# Patient Record
Sex: FEMALE | Race: BLACK OR AFRICAN AMERICAN | NOT HISPANIC OR LATINO
[De-identification: names, ages, dates, MRNs, and addresses within clinical notes are randomized per-mention and may not be internally consistent; named-entity substitution may affect disease eponyms.]

---
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
## 2022-04-10 ENCOUNTER — INPATIENT (INPATIENT)
Facility: HOSPITAL | Age: 22
LOS: 9 days | Discharge: HOME | End: 2022-04-20
Attending: PSYCHIATRY & NEUROLOGY | Admitting: PSYCHIATRY & NEUROLOGY
Payer: COMMERCIAL

## 2022-04-10 VITALS
TEMPERATURE: 98 F | HEART RATE: 83 BPM | SYSTOLIC BLOOD PRESSURE: 112 MMHG | DIASTOLIC BLOOD PRESSURE: 75 MMHG | RESPIRATION RATE: 18 BRPM | OXYGEN SATURATION: 100 %

## 2022-04-10 DIAGNOSIS — F32.A DEPRESSION, UNSPECIFIED: ICD-10-CM

## 2022-04-10 DIAGNOSIS — F41.9 ANXIETY DISORDER, UNSPECIFIED: ICD-10-CM

## 2022-04-10 LAB
ALBUMIN SERPL ELPH-MCNC: 4.5 G/DL — SIGNIFICANT CHANGE UP (ref 3.3–5)
ALP SERPL-CCNC: 83 U/L — SIGNIFICANT CHANGE UP (ref 40–120)
ALT FLD-CCNC: 8 U/L — SIGNIFICANT CHANGE UP (ref 4–33)
AMPHET UR-MCNC: NEGATIVE — SIGNIFICANT CHANGE UP
ANION GAP SERPL CALC-SCNC: 15 MMOL/L — HIGH (ref 7–14)
APAP SERPL-MCNC: <10 UG/ML — LOW (ref 15–25)
APPEARANCE UR: CLEAR — SIGNIFICANT CHANGE UP
AST SERPL-CCNC: 19 U/L — SIGNIFICANT CHANGE UP (ref 4–32)
B PERT DNA SPEC QL NAA+PROBE: SIGNIFICANT CHANGE UP
B PERT+PARAPERT DNA PNL SPEC NAA+PROBE: SIGNIFICANT CHANGE UP
BARBITURATES UR SCN-MCNC: NEGATIVE — SIGNIFICANT CHANGE UP
BASOPHILS # BLD AUTO: 0.03 K/UL — SIGNIFICANT CHANGE UP (ref 0–0.2)
BASOPHILS NFR BLD AUTO: 0.5 % — SIGNIFICANT CHANGE UP (ref 0–2)
BENZODIAZ UR-MCNC: NEGATIVE — SIGNIFICANT CHANGE UP
BILIRUB SERPL-MCNC: 0.2 MG/DL — SIGNIFICANT CHANGE UP (ref 0.2–1.2)
BILIRUB UR-MCNC: NEGATIVE — SIGNIFICANT CHANGE UP
BORDETELLA PARAPERTUSSIS (RAPRVP): SIGNIFICANT CHANGE UP
BUN SERPL-MCNC: 7 MG/DL — SIGNIFICANT CHANGE UP (ref 7–23)
C PNEUM DNA SPEC QL NAA+PROBE: SIGNIFICANT CHANGE UP
CALCIUM SERPL-MCNC: 9.5 MG/DL — SIGNIFICANT CHANGE UP (ref 8.4–10.5)
CHLORIDE SERPL-SCNC: 104 MMOL/L — SIGNIFICANT CHANGE UP (ref 98–107)
CO2 SERPL-SCNC: 19 MMOL/L — LOW (ref 22–31)
COCAINE METAB.OTHER UR-MCNC: NEGATIVE — SIGNIFICANT CHANGE UP
COLOR SPEC: SIGNIFICANT CHANGE UP
COVID-19 SPIKE DOMAIN AB INTERP: POSITIVE
COVID-19 SPIKE DOMAIN ANTIBODY RESULT: >250 U/ML — HIGH
CREAT SERPL-MCNC: 0.78 MG/DL — SIGNIFICANT CHANGE UP (ref 0.5–1.3)
CREATININE URINE RESULT, DAU: 128 MG/DL — SIGNIFICANT CHANGE UP
DIFF PNL FLD: NEGATIVE — SIGNIFICANT CHANGE UP
EGFR: 112 ML/MIN/1.73M2 — SIGNIFICANT CHANGE UP
EOSINOPHIL # BLD AUTO: 0.06 K/UL — SIGNIFICANT CHANGE UP (ref 0–0.5)
EOSINOPHIL NFR BLD AUTO: 1.1 % — SIGNIFICANT CHANGE UP (ref 0–6)
ETHANOL SERPL-MCNC: <10 MG/DL — SIGNIFICANT CHANGE UP
FLUAV SUBTYP SPEC NAA+PROBE: SIGNIFICANT CHANGE UP
FLUBV RNA SPEC QL NAA+PROBE: SIGNIFICANT CHANGE UP
GLUCOSE SERPL-MCNC: 102 MG/DL — HIGH (ref 70–99)
GLUCOSE UR QL: NEGATIVE — SIGNIFICANT CHANGE UP
HADV DNA SPEC QL NAA+PROBE: SIGNIFICANT CHANGE UP
HCG SERPL-ACNC: <5 MIU/ML — SIGNIFICANT CHANGE UP
HCOV 229E RNA SPEC QL NAA+PROBE: SIGNIFICANT CHANGE UP
HCOV HKU1 RNA SPEC QL NAA+PROBE: SIGNIFICANT CHANGE UP
HCOV NL63 RNA SPEC QL NAA+PROBE: SIGNIFICANT CHANGE UP
HCOV OC43 RNA SPEC QL NAA+PROBE: SIGNIFICANT CHANGE UP
HCT VFR BLD CALC: 40.4 % — SIGNIFICANT CHANGE UP (ref 34.5–45)
HGB BLD-MCNC: 13 G/DL — SIGNIFICANT CHANGE UP (ref 11.5–15.5)
HMPV RNA SPEC QL NAA+PROBE: SIGNIFICANT CHANGE UP
HPIV1 RNA SPEC QL NAA+PROBE: SIGNIFICANT CHANGE UP
HPIV2 RNA SPEC QL NAA+PROBE: SIGNIFICANT CHANGE UP
HPIV3 RNA SPEC QL NAA+PROBE: SIGNIFICANT CHANGE UP
HPIV4 RNA SPEC QL NAA+PROBE: SIGNIFICANT CHANGE UP
IANC: 2.65 K/UL — SIGNIFICANT CHANGE UP (ref 1.8–7.4)
IMM GRANULOCYTES NFR BLD AUTO: 0.2 % — SIGNIFICANT CHANGE UP (ref 0–1.5)
KETONES UR-MCNC: NEGATIVE — SIGNIFICANT CHANGE UP
LEUKOCYTE ESTERASE UR-ACNC: NEGATIVE — SIGNIFICANT CHANGE UP
LYMPHOCYTES # BLD AUTO: 2.47 K/UL — SIGNIFICANT CHANGE UP (ref 1–3.3)
LYMPHOCYTES # BLD AUTO: 43.6 % — SIGNIFICANT CHANGE UP (ref 13–44)
M PNEUMO DNA SPEC QL NAA+PROBE: SIGNIFICANT CHANGE UP
MCHC RBC-ENTMCNC: 26.8 PG — LOW (ref 27–34)
MCHC RBC-ENTMCNC: 32.2 GM/DL — SIGNIFICANT CHANGE UP (ref 32–36)
MCV RBC AUTO: 83.3 FL — SIGNIFICANT CHANGE UP (ref 80–100)
METHADONE UR-MCNC: NEGATIVE — SIGNIFICANT CHANGE UP
MONOCYTES # BLD AUTO: 0.44 K/UL — SIGNIFICANT CHANGE UP (ref 0–0.9)
MONOCYTES NFR BLD AUTO: 7.8 % — SIGNIFICANT CHANGE UP (ref 2–14)
NEUTROPHILS # BLD AUTO: 2.65 K/UL — SIGNIFICANT CHANGE UP (ref 1.8–7.4)
NEUTROPHILS NFR BLD AUTO: 46.8 % — SIGNIFICANT CHANGE UP (ref 43–77)
NITRITE UR-MCNC: NEGATIVE — SIGNIFICANT CHANGE UP
NRBC # BLD: 0 /100 WBCS — SIGNIFICANT CHANGE UP
NRBC # FLD: 0 K/UL — SIGNIFICANT CHANGE UP
OPIATES UR-MCNC: NEGATIVE — SIGNIFICANT CHANGE UP
OXYCODONE UR-MCNC: NEGATIVE — SIGNIFICANT CHANGE UP
PCP SPEC-MCNC: SIGNIFICANT CHANGE UP
PCP UR-MCNC: NEGATIVE — SIGNIFICANT CHANGE UP
PH UR: 6.5 — SIGNIFICANT CHANGE UP (ref 5–8)
PLATELET # BLD AUTO: 274 K/UL — SIGNIFICANT CHANGE UP (ref 150–400)
POTASSIUM SERPL-MCNC: 3.9 MMOL/L — SIGNIFICANT CHANGE UP (ref 3.5–5.3)
POTASSIUM SERPL-SCNC: 3.9 MMOL/L — SIGNIFICANT CHANGE UP (ref 3.5–5.3)
PROT SERPL-MCNC: 7.9 G/DL — SIGNIFICANT CHANGE UP (ref 6–8.3)
PROT UR-MCNC: NEGATIVE — SIGNIFICANT CHANGE UP
RAPID RVP RESULT: SIGNIFICANT CHANGE UP
RBC # BLD: 4.85 M/UL — SIGNIFICANT CHANGE UP (ref 3.8–5.2)
RBC # FLD: 12.8 % — SIGNIFICANT CHANGE UP (ref 10.3–14.5)
RSV RNA SPEC QL NAA+PROBE: SIGNIFICANT CHANGE UP
RV+EV RNA SPEC QL NAA+PROBE: SIGNIFICANT CHANGE UP
SALICYLATES SERPL-MCNC: <0.3 MG/DL — LOW (ref 15–30)
SARS-COV-2 IGG+IGM SERPL QL IA: >250 U/ML — HIGH
SARS-COV-2 IGG+IGM SERPL QL IA: POSITIVE
SARS-COV-2 RNA SPEC QL NAA+PROBE: SIGNIFICANT CHANGE UP
SODIUM SERPL-SCNC: 138 MMOL/L — SIGNIFICANT CHANGE UP (ref 135–145)
SP GR SPEC: 1.02 — SIGNIFICANT CHANGE UP (ref 1–1.05)
THC UR QL: NEGATIVE — SIGNIFICANT CHANGE UP
TOXICOLOGY SCREEN, DRUGS OF ABUSE, SERUM RESULT: SIGNIFICANT CHANGE UP
TSH SERPL-MCNC: 0.67 UIU/ML — SIGNIFICANT CHANGE UP (ref 0.5–4.3)
UROBILINOGEN FLD QL: SIGNIFICANT CHANGE UP
WBC # BLD: 5.66 K/UL — SIGNIFICANT CHANGE UP (ref 3.8–10.5)
WBC # FLD AUTO: 5.66 K/UL — SIGNIFICANT CHANGE UP (ref 3.8–10.5)

## 2022-04-10 PROCEDURE — 93010 ELECTROCARDIOGRAM REPORT: CPT

## 2022-04-10 PROCEDURE — 99285 EMERGENCY DEPT VISIT HI MDM: CPT | Mod: 25

## 2022-04-10 RX ORDER — HYDROXYZINE HCL 10 MG
25 TABLET ORAL EVERY 6 HOURS
Refills: 0 | Status: DISCONTINUED | OUTPATIENT
Start: 2022-04-11 | End: 2022-04-20

## 2022-04-10 NOTE — ED BEHAVIORAL HEALTH ASSESSMENT NOTE - RISK ASSESSMENT
Low Acute Suicide Risk RISK FACTORS:  Modifiable risk factors: depression, anxiety, bertha, SI  Unmodifiable risk factors: self reports that she has been doing poorly, self report hx of bipolar disorder and anxiety, not receiving psych meds, hx of trauma, family hx of depression, family hx of alcoholism    Protective factors: currently denies (and no objective evidence of) acute suicidality, no hx of SA or any self injurious behaviors,  Domiciled, part time employed and in school, she is future-oriented/ help seeking, endorses responsibility to family (particularly to sister), no access to lethal means like guns, no complex medical issues or chronic pains, is not acutely manic or floridly psychotic, no hx of violence or any pending legal cases, not intoxicated or in withdrawal, Restorationism    At this time given all risks taken into consideration, the Pt is Not at imminent risk for self harm/ suicide and is also not at chronically elevated risk of harming self.  however, Pt's current presentation is not be deemed dischargeable back to the community.  Current increased in risks can be mitigated by a psychiatric admission with supervision, continuing psych meds with titration towards efficacious dosing range Moderate Acute Suicide Risk RISK FACTORS:  Modifiable risk factors: depression, anxiety, bertha, on and off SI with plans  Unmodifiable risk factors: self reports that she has been doing poorly, self report hx of bipolar disorder and anxiety, not receiving psych meds, hx of trauma, family hx of depression, family hx of alcoholism    Protective factors: currently denies (and no objective evidence of) acute suicidality, no hx of SA or any self injurious behaviors,  Domiciled, part time employed and in school, she is future-oriented/ help seeking, endorses responsibility to family (particularly to sister), no access to lethal means like guns, no complex medical issues or chronic pains, is not acutely manic or floridly psychotic, no hx of violence or any pending legal cases, not intoxicated or in withdrawal, Adventist    At this time given all risks taken into consideration, even though currently Pt is not acutely suicidal, given that there is recurring SI with 2 plans along with other factors (as mentioned above), the Pt poses as moderate acute risk towards self harm.  however, she is not at chronically elevated risk of harming self.  nevertheless, Pt's current presentation is not be deemed dischargeable back to the community.  Current increased in risks can be mitigated by a psychiatric admission with supervision, continuing psych meds with titration towards efficacious dosing range

## 2022-04-10 NOTE — ED BEHAVIORAL HEALTH ASSESSMENT NOTE - NAME OF SCHOOL
[employer] ([employer]) Hourly Rounding/Move Toilet (commode/urinal) to patient using "arms reach"/Enhanced Supervision

## 2022-04-10 NOTE — ED BEHAVIORAL HEALTH ASSESSMENT NOTE - PSYCHIATRIC ISSUES AND PLAN (INCLUDE STANDING AND PRN MEDICATION)
defer starting anti-depressant/ mood stabilizer to primary treatment team. PRN: atarax 25mg PO q6hrs for anxiety; PRN: ativan 0.5mg PO q6Hrs for agitation; PRN: ativan 1mg IM q6Hrs for severe agitation

## 2022-04-10 NOTE — ED BEHAVIORAL HEALTH ASSESSMENT NOTE - SUMMARY
21/F with self reported hx of bipolar disorder and anxiety; no prior psych admissions; no reported hx of SA nor previously/ recently engaged in self injurious behaviors and no illicit substance use. Today, self presented to the ED due to worsening depression and SI (with plans).    at this time, endorses symptoms of worsening depression and anxiety along with SI (2 previous plans). historical accounts provided by Pt points to a possible bipolar depression.. of course, will also need to entertain for a possible axis II pathology (borderline PD) for this Pt.      Pt is affectively dysregulated, is feeling increasingly hopeless, helpless and worthless; having on & off suicidal thoughts.. at this time, is not homicidal.  is not acutely manic or psychotic.  Pt is unable to partake towards safety planning enough to justify for a safe discharge. she is help seeking and requests in-Pt psych admission.  hence, to facilitate this request with aim of mood stabilization and ensuring safety    RECOMMENDATIONS:  1. defer starting anti-depressant/ mood stabilizer to primary treatment team.   2. PRN: atarax 25mg PO q6hrs for anxiety  3. PRN: ativan 0.5mg PO q6Hrs for agitation; PRN: ativan 1mg IM q6Hrs for severe agitation  4. ONCE medically cleared, facilitate transfer to [facility] on 9.13 status  - no need for constant 1:1   - for now, no beds are available at [facility]. hence, to temporarily board here at the  ED  - discussed with  ED staff

## 2022-04-10 NOTE — ED BEHAVIORAL HEALTH ASSESSMENT NOTE - NS ED BHA PLAN ADMIT TO PSYCHIATRY BH CONTACTED FT
attempted to contact Pt's therapist, Lawanda Lobato at [phone] - not working/ [phone] - no answer; left voice mail attempted to contact Pt's therapist, Lawanda Lobato at [phone] - not working/ [phone]/ [phone]  - no answer; left voice mail
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
## 2022-04-10 NOTE — ED PROVIDER NOTE - CLINICAL SUMMARY MEDICAL DECISION MAKING FREE TEXT BOX
20 y/o F     Labs, Urine Tox/ UA, EKG  Medical evaluation performed. There is no clinical evidence of intoxication or any acute medical problem requiring immediate intervention. Patient is awaiting psychiatric consultation. Final disposition will be determined by psychiatrist.

## 2022-04-10 NOTE — ED BEHAVIORAL HEALTH NOTE - BEHAVIORAL HEALTH NOTE
COVID Exposure Screen- Patient  1.	*Have you had a COVID-19 test in the last 90 days?  (  ) Yes   ( X ) No   (  ) Unknown- Reason: _____  IF YES PROCEED TO QUESTION #2. IF NO OR UNKNOWN, PLEASE SKIP TO QUESTION #3.  2.	Date of test(s) and result(s): ________  3.	*Have you tested positive for COVID-19 antibodies? (  ) Yes   ( X ) No   (  ) Unknown- Reason: _____  IF YES PROCEED TO QUESTION #4. IF NO or UNKNOWN, PLEASE SKIP TO QUESTION #5.  4.	Date of positive antibody test: ________  5.	*Have you received 2 doses of the COVID-19 vaccine? (  ) Yes   ( X ) No   (  ) Unknown- Reason: _____   IF YES PROCEED TO QUESTION #6. IF NO or UNKNOWN, PLEASE SKIP TO QUESTION #7.  6.	Date of second dose: ________ RECEIVED SINGLE DOSE J & J vaccine last   7.	*In the past 10 days, have you been around anyone with a positive COVID-19 test?* (  ) Yes   ( X ) No   (  ) Unknown- Reason: ____  IF YES PROCEED TO QUESTION #8. IF NO or UNKNOWN, PLEASE SKIP TO QUESTION #13.  8.	Were you within 6 feet of them for at least 15 minutes? (  ) Yes   (  ) No   (  ) Unknown- Reason: _____  9.	Have you provided care for them? (  ) Yes   (  ) No   (  ) Unknown- Reason: ______  10.	Have you had direct physical contact with them (touched, hugged, or kissed them)? (  ) Yes   (  ) No    (  ) Unknown- Reason: _____  11.	Have you shared eating or drinking utensils with them? (  ) Yes   (  ) No    (  ) Unknown- Reason: ____  12.	Have they sneezed, coughed, or somehow gotten respiratory droplets on you? (  ) Yes   (  ) No    (  ) Unknown- Reason: ______  13.	*Have you been out of [place] within the past 10 days?* (  ) Yes   ( X ) No   (  ) Unknown- Reason: _____  IF YES PLEASE ANSWER THE FOLLOWING QUESTIONS:  14.	Which state/country have you been to? ______  15.	Were you there over 24 hours? (  ) Yes   (  ) No    (  ) Unknown- Reason: ______  16.	Date of return to [facility]: ______

## 2022-04-10 NOTE — ED ADULT NURSE REASSESSMENT NOTE - NS ED NURSE REASSESS COMMENT FT1
Resting in bed A and Ox3 in NAD calm and cooperative, answers to questions appropriately. denies SI, HI talia JEWELLH.

## 2022-04-10 NOTE — ED BEHAVIORAL HEALTH ASSESSMENT NOTE - OTHER PAST PSYCHIATRIC HISTORY (INCLUDE DETAILS REGARDING ONSET, COURSE OF ILLNESS, INPATIENT/OUTPATIENT TREATMENT)
self reported hx of bipolar disorder and anxiety  no prior psych admissions  no reported hx of SA nor previously/ recently engaged in self injurious behaviors   currently, in therapy with Ms Lawanda Lobato  no psychiatrist  never been on psych meds
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
## 2022-04-10 NOTE — ED ADULT NURSE NOTE - OBJECTIVE STATEMENT
Pt presents to , alert&orientedx4, ambulatory, sent in by her psychiatrist coming to ED for SI with a plan to jump off a bridge. Denies any prior hx of self-harm. Denies any HI, auditory or visual hallucinations. No drug use or ETOH use. Pt is calm and cooperative at this time.  Pending psych eval

## 2022-04-10 NOTE — ED BEHAVIORAL HEALTH ASSESSMENT NOTE - DESCRIPTION
single and currently living with her friend, Lise (in [facility]).. been living with Mary since 2/2022. goes to trade school ([employer]). has 2 siblings (a half brother whom she is not close with and a 17 yr old sister, Holly. she likes playing videogames - [employer] as well as viewing Lifestyle Vlogging on [employer].  Pt is Holiness. no reported pets. no reported access to guns pre-DM, HLD. NOT ON MAINTENANCE MEDS Since her  ED arrival, the Pt has been calm and cooperative.  There has been no occurrence of agitation/aggressive behavior.  No current verbalization of active/ passive SI/HI.   There are no signs/symptoms of acute bertha or florid psychosis.  Pt is not showing any signs/symptoms of intoxication or withdrawal.  she is not delirious.  Pt has not tested limits.. Has maintained appropriate boundaries. Pt has been easily redirected.  Overall, since her  ED admission, there has been no reported management issues.      Vital Signs Last 24 Hrs  T(C): 36.7 (10 Apr 2022 15:45), Max: 36.7 (10 Apr 2022 15:45)  T(F): 98.1 (10 Apr 2022 15:45), Max: 98.1 (10 Apr 2022 15:45)  HR: 83 (10 Apr 2022 15:45) (83 - 83)  BP: 112/75 (10 Apr 2022 15:45) (112/75 - 112/75)  BP(mean): --  RR: 18 (10 Apr 2022 15:45) (18 - 18)  SpO2: 100% (10 Apr 2022 15:45) (100% - 100%) single and currently living with her friend, Mary (in [facility]).. been living with Mary since 2/2022. goes to trade school ([employer]). has 2 siblings (a half brother whom she is not close with and a 17 yr old sister, Holly. she likes playing videogames - [employer] as well as viewing Lifestyle Vlogging on [employer].  Pt is Cheondoism. no reported pets. no reported access to guns. employed part time as a HHA
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
## 2022-04-10 NOTE — ED ADULT TRIAGE NOTE - CHIEF COMPLAINT QUOTE
Pt sent by therapist for SI with a plan. Pt calm and cooperative, denies hallucinations or drugs/etoh. PMH bipolar, not on meds

## 2022-04-10 NOTE — ED PROVIDER NOTE - OBJECTIVE STATEMENT
22 y/o F   presents to ER   secondary to  worsening depression and  suicidal ideations . Admits to multiple social  stressors and inability to cope. Denies AH/HI/VH.   Denies falling, punching or kicking any objects. Denies pain, SOB, fever , chills,  chest /abdominal discomfort.  Denies use of alcohol or illicit drugs.   No evidence of physical injuries, broken skin or deformities.

## 2022-04-10 NOTE — ED BEHAVIORAL HEALTH ASSESSMENT NOTE - OTHER
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 does not appear to be internally stimulated passive SI - currently no active SI; no intent or plans close friend since 2/2022 sister and a close friend by hx: impaired distracted not on psych meds

## 2022-04-10 NOTE — ED BEHAVIORAL HEALTH ASSESSMENT NOTE - HPI (INCLUDE ILLNESS QUALITY, SEVERITY, DURATION, TIMING, CONTEXT, MODIFYING FACTORS, ASSOCIATED SIGNS AND SYMPTOMS)
21 yr old female, single, domiciled and part time employed/ going to trade school. self reported hx of bipolar disorder and anxiety; no prior psych admissions; no reported hx of SA nor previously/ recently engaged in self injurious behaviors and no illicit substance use. Today, self presented to the ED accomapnied by her sister and close friend upon the behest of her therapist for evaluation of worsening depression and SI (with plans). 21 yr old female, single, domiciled and part time employed/ going to trade school. self reported hx of bipolar disorder and anxiety; no prior psych admissions; no reported hx of SA nor previously/ recently engaged in self injurious behaviors and no illicit substance use. Today, self presented to the ED accompanied by her sister and close friend upon the behest of her therapist for evaluation of worsening depression and SI (with plans). 21 yr old female, single, domiciled and part time employed/ going to trade school. self reported hx of bipolar disorder and anxiety; no prior psych admissions; no reported hx of SA nor previously/ recently engaged in self injurious behaviors and no illicit substance use. Today, self presented to the ED accompanied by her sister and close friend upon behest of her therapist for evaluation of worsening depression and SI (with plans). 21 yr old female, single, domiciled and part time employed/ going to trade school. self reported hx of bipolar disorder and anxiety; no prior psych admissions; no reported hx of SA nor previously/ recently engaged in self injurious behaviors and no illicit substance use. Today, self presented to the ED accompanied by her sister and close friend upon behest of her therapist for evaluation of worsening depression and SI (with plans).    she is seen bedside. appeared calm, cooperative and not internally preoccupied. reports of being diagnosed with bipolar illness at age 18.  symptoms experienced best described as having elevated mood associated with increased goal directed activities, racing thoughts, engaged in risk taking behavior like unplanned trips, spending sprees; has hyperverbality, increased in energy level causing sleep disruption (5 hrs nightly).  said symptoms supposedly lasting for 1 week or less with no reported compromise in her overall functionality.  last manic episode was back in 12/2021.  prior to this, another manic episode in 8/2021.      after her manic episodes, claimed that she would usually "crash".  crashing meant she would experience, predominantly depressive symptoms.  depression had been described as experiencing sad mood daily along with anhedonia. has been feeling amotivated/ not wanting to do anything. there are associated symptoms of low energy level, poor concentration, disturbance in her eating patterns (over eating at times, sometimes having decreased appetite) as well as hypersomnolence (10-14 hrs nighty).  along with current depressive symptoms, she admits chronic/ intermittent passive SI.. however, lately since the start of April, said SI has been worsening.. she has SI with 2 plans (going to [employer] and buying a rope so that she could hang self OR going to a dock and jumping into the water).  Along with said depression and SI, she also reports feeling anxious.  anxiety best described as "an overall feeling of nervousness".  there are times, that she get more anxious because of perceived criticism/ negative perception towards her work/ performance from other people.  Otherwise, pt denied experiencing any other specific anxiety disorder symptoms like NORA, agoraphobia, specific phobia, panic attacks/ panic disorder, no PTSD symptoms.  She denied feeling paranoid nor has she experienced any perceptual disturbances. Today, she as felt depressed, anxious and having on & off suicidal thoughts, she told her friend and sister that she needed to come to the hospital.. they reach out to Pt's therapist who encouraged Pt to come to the ED for further evaluation.      currently, she denied having SI or HI. whilst she did have those "distressing suicidal thoughts + hatched plans", she admits being afraid/ anxious of the thought of botching the attempt of killing self which would lead her to be physically disabled for life; i.e. she fears that if she survives the attempted suicide AND eventually bears the brunt of neurovascular sequelae, she would not be able to handle and accept the consequences. e.g. if she hangs self and survives but ended up in a vegetative state, she would have intense difficulty as well as her family/ friends dealing with this. hence, she has not decided to consummate on these recurring suicidal thoughts       ** see collateral information obtained from her friend/ sister **

## 2022-04-10 NOTE — ED BEHAVIORAL HEALTH ASSESSMENT NOTE - NSBHROSSYSTEMS_PSY_ALL_CORE
she currently denied any headaches, no dizziness, no blurring of vision; no sorethroat; no cough, no fever. no chest pains, no SOB, no palpitations, no abdominal pains, no nausea/ vomiting/ diarrhea, no dysuria, no hesitancy, no arthralgia/ no pruritus. denied any muscle/ joint pains

## 2022-04-10 NOTE — ED BEHAVIORAL HEALTH ASSESSMENT NOTE - NSSUICPROTFACT_PSY_ALL_CORE
Supportive social network of family or friends/Fear of death or the actual act of killing self/Engaged in work or school/Positive therapeutic relationships/Caodaism beliefs

## 2022-04-11 DIAGNOSIS — F33.9 MAJOR DEPRESSIVE DISORDER, RECURRENT, UNSPECIFIED: ICD-10-CM

## 2022-04-11 RX ORDER — ACETAMINOPHEN 500 MG
650 TABLET ORAL ONCE
Refills: 0 | Status: COMPLETED | OUTPATIENT
Start: 2022-04-11 | End: 2022-04-11

## 2022-04-11 RX ADMIN — Medication 650 MILLIGRAM(S): at 21:32

## 2022-04-11 RX ADMIN — Medication 650 MILLIGRAM(S): at 20:32

## 2022-04-11 NOTE — ED ADULT NURSE REASSESSMENT NOTE - NS ED NURSE REASSESS COMMENT FT1
Break Coverage RN:  Pt A&Ox4, NAD. Calm and cooperative at this time.  Denies any complaints.  Respirations even and unlabored on room air.

## 2022-04-11 NOTE — BH CONSULTATION LIAISON PROGRESS NOTE - NSBHASSESSMENTFT_PSY_ALL_CORE
21/F with self reported hx of bipolar disorder and anxiety; no prior psych admissions; no reported hx of SA nor previously/ recently engaged in self injurious behaviors and no illicit substance use. Was seen yesterday by this writer ue to worsening depression and SI (with plans). currently, continues to endorse feeling depressed and anxious.  whilst she did previously harbor SI with 2 previous plans hatched, currently denied having any SI. no HI.  of note, previous historical accounts provided by the Pt points to a possible bipolar depression..     at this time, continues to be affectively dysregulated, feeling hopeless, helpless and worthless; having on & off SI.  continues to be unable to partake towards safety planning. feels unsafe at home. Pt is help seeking and requests in-Pt psych admission.

## 2022-04-11 NOTE — BH PATIENT PROFILE - FALL HARM RISK - UNIVERSAL INTERVENTIONS
Non-slip footwear when patient is out of bed/[place] to call system/Physically safe environment - no spills, clutter or unnecessary equipment/Purposeful Proactive Rounding/Room/bathroom lighting operational, light cord in reach

## 2022-04-11 NOTE — BH CONSULTATION LIAISON PROGRESS NOTE - NSBHFUPINTERVALHXFT_PSY_A_CORE
was seen yesterday by this writer after she self presented to the ED due to worsening depression and SI (with plans).  reported that since the start of April, has been increasingly feeling sadder, with anxiety and having "distressing SI" with 2 plans (going to [employer] and buying a rope so that she could hang self OR going to a dock and jumping into the water).     continues to report feeling depressed.. anxious whilst during her overnight stay at the  ED. does not feel safe to go back home. bent on pursuing in-pt psych admission.      Since her  ED arrival, the Pt has been calm and cooperative.  There has been no occurrence of agitation/aggressive behavior.  No reported active SI/HI.   has not manifested any signs/symptoms of acute bertha or florid psychosis.  Pt is not showing any signs/symptoms of intoxication or withdrawal.  she is not delirious.  Pt has not tested limits.. Has maintained appropriate boundaries. Pt has been easily redirected.  Overall, there has been no reported management issues overnight.

## 2022-04-11 NOTE — BH PATIENT PROFILE - NSPROPTRIGHTSUPPORTPERSON_GEN_A_NUR
yes Griseofulvin Counseling:  I discussed with the patient the risks of griseofulvin including but not limited to photosensitivity, cytopenia, liver damage, nausea/vomiting and severe allergy.  The patient understands that this medication is best absorbed when taken with a fatty meal (e.g., ice cream or french fries).

## 2022-04-11 NOTE — BH CONSULTATION LIAISON PROGRESS NOTE - NSBHCONSULTPSYCHPLAN_PSY_A_CORE FT
1. defer starting anti-depressant/ mood stabilizer to primary treatment team.   2. PRN: atarax 25mg PO q6hrs for anxiety  3. PRN: ativan 0.5mg PO q6Hrs for agitation; PRN: ativan 1mg IM q6Hrs for severe agitation

## 2022-04-11 NOTE — BH CONSULTATION LIAISON PROGRESS NOTE - NSBHCONSBHPROVDETAILS_PSY_A_CORE  FT
discussed with Pt's therapist, Lawanda Lobato at [phone]  - last night; Ms Lobato advocated for Pt's psych admission

## 2022-04-11 NOTE — BH CONSULTATION LIAISON PROGRESS NOTE - NSBHCHARTREVIEWVS_PSY_A_CORE FT
Vital Signs Last 24 Hrs  T(C): 36.8 (11 Apr 2022 03:58), Max: 36.9 (10 Apr 2022 20:54)  T(F): 98.2 (11 Apr 2022 03:58), Max: 98.5 (10 Apr 2022 20:54)  HR: 52 (11 Apr 2022 03:58) (52 - 83)  BP: 111/69 (11 Apr 2022 03:58) (110/73 - 112/75)  BP(mean): --  RR: 15 (11 Apr 2022 03:58) (15 - 18)  SpO2: 100% (11 Apr 2022 03:58) (100% - 100%)

## 2022-04-11 NOTE — BH CONSULTATION LIAISON PROGRESS NOTE - NSBHCHARTREVIEWLAB_PSY_A_CORE FT
LABS:                        13.0   5.66  )-----------( 274      ( 10 Apr 2022 16:22 )             40.4     10 Apr 2022 16:22    138    |  104    |  7      ----------------------------<  102    3.9     |  19     |  0.78     Ca    9.5        10 Apr 2022 16:22    TPro  7.9    /  Alb  4.5    /  TBili  0.2    /  DBili  x      /  AST  19     /  ALT  8      /  AlkPhos  83     10 Apr 2022 16:22    TOX: negative  BAL < 10    Covid: negative  AB +

## 2022-04-12 LAB
CHOLEST SERPL-MCNC: 155 MG/DL — SIGNIFICANT CHANGE UP
HDLC SERPL-MCNC: 66 MG/DL — SIGNIFICANT CHANGE UP
LIPID PNL WITH DIRECT LDL SERPL: 80 MG/DL — SIGNIFICANT CHANGE UP
NON HDL CHOLESTEROL: 89 MG/DL — SIGNIFICANT CHANGE UP
TRIGL SERPL-MCNC: 43 MG/DL — SIGNIFICANT CHANGE UP

## 2022-04-12 PROCEDURE — 99222 1ST HOSP IP/OBS MODERATE 55: CPT

## 2022-04-12 PROCEDURE — 90853 GROUP PSYCHOTHERAPY: CPT

## 2022-04-12 RX ORDER — ESCITALOPRAM OXALATE 10 MG/1
5 TABLET, FILM COATED ORAL DAILY
Refills: 0 | Status: DISCONTINUED | OUTPATIENT
Start: 2022-04-12 | End: 2022-04-14

## 2022-04-12 RX ORDER — IBUPROFEN 200 MG
600 TABLET ORAL EVERY 8 HOURS
Refills: 0 | Status: DISCONTINUED | OUTPATIENT
Start: 2022-04-12 | End: 2022-04-20

## 2022-04-12 RX ORDER — ESCITALOPRAM OXALATE 10 MG/1
5 TABLET, FILM COATED ORAL ONCE
Refills: 0 | Status: COMPLETED | OUTPATIENT
Start: 2022-04-12 | End: 2022-04-12

## 2022-04-12 RX ADMIN — Medication 600 MILLIGRAM(S): at 13:03

## 2022-04-12 RX ADMIN — ESCITALOPRAM OXALATE 5 MILLIGRAM(S): 10 TABLET, FILM COATED ORAL at 11:22
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
## 2022-04-12 NOTE — PSYCHIATRIC REHAB INITIAL EVALUATION - NSBHPRRECOMMEND_PSY_ALL_CORE
Writer met with pt. to orient her to psych rehab staff and services. Pt. is being transferred to college unit because she is currently enrolled in trade school to become a . Pt. is a 21-year-old, single, , female who has been admitted for worsening suicidal ideation and depression. Pt. had a plan to commit suicide by hanging herself or jumping into a body of water off a dock. Pt. reported that her primary fear was attempting suicide and it failing and her family and friends having to take care of her in a vegetative/paralyzed state. Pt. presented with a calm mood and a bright affect. Pt. was very forthcoming with information and personal history. Pt is a reliable historian as her verbal report matched with ED reports. Pt. was diagnosed with bipolar disorder when she was 18 years old but while on the unit the doctors reported that she has BPD. Pt. reports feeling uneasy about being diagnosed with a personality disorder but displays insight evidenced by her understanding the importance of a correct diagnosis to receive the proper treatment. Writer established collaborative treatment goal with pt. to identify coping skills to support her management of symptoms. Currently, Pt. Denies SI/SH/AH/VH/HI

## 2022-04-12 NOTE — BH INPATIENT PSYCHIATRY PROGRESS NOTE - NSBHCHARTREVIEWVS_PSY_A_CORE FT
Vital Signs Last 24 Hrs  T(C): 36.5 (04-12-22 @ 08:22), Max: 36.8 (04-11-22 @ 12:15)  T(F): 97.7 (04-12-22 @ 08:22), Max: 98.3 (04-11-22 @ 12:15)  HR: 82 (04-11-22 @ 12:15) (82 - 82)  BP: 107/74 (04-11-22 @ 12:15) (107/74 - 107/74)  BP(mean): --  RR: 16 (04-11-22 @ 12:15) (16 - 16)  SpO2: 100% (04-11-22 @ 12:15) (100% - 100%)    Orthostatic VS  04-12-22 @ 08:22  Lying BP: --/-- HR: --  Sitting BP: 101/69 HR: 90  Standing BP: 105/72 HR: 95  Site: upper left arm  Mode: electronic  Orthostatic VS  04-11-22 @ 15:29  Lying BP: --/-- HR: --  Sitting BP: 118/75 HR: 102  Standing BP: 110/78 HR: 100  Site: --  Mode: --

## 2022-04-12 NOTE — BH INPATIENT PSYCHIATRY ASSESSMENT NOTE - RISK ASSESSMENT
Pt recently with suicidal thoughts and thinking about several suicide plans; pt therefore high risk and requires hospitalization for safety to self

## 2022-04-12 NOTE — BH INPATIENT PSYCHIATRY ASSESSMENT NOTE - NSCOMMENTSUICPROTRISKFACT_PSY_ALL_CORE
Identifies significant deterrent as fear of attempting suicide and botching it such that it results in significant nonfatal injury or harm
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
## 2022-04-12 NOTE — BH INPATIENT PSYCHIATRY ASSESSMENT NOTE - NSBHMSELANG_PSY_A_CORE
History and Physical - [facility]  Irene Jiang 49 y o  male MRN: [id number]                  HPI: Irene Jiang is a 49y o  year old male who presents for esophagitis, screen for gonzalez's, gastric IM  REVIEW OF SYSTEMS: Per the HPI, and otherwise unremarkable  Historical Information   Past Medical History:   Diagnosis Date    Atrial fibrillation ([facility] [address] )     Hypertension      Past Surgical History:   Procedure Laterality Date    CARDIAC ELECTROPHYSIOLOGY MAPPING AND ABLATION  03/2020    COLONOSCOPY      years ago     Social History   Social History     Substance and Sexual Activity   Alcohol Use Yes    Drinks per session: 1 or 2    Comment: patient states decreasing ETOH intake     Social History     Substance and Sexual Activity   Drug Use No     Social History     Tobacco Use   Smoking Status Former Smoker    Packs/day: 0 50    Types: Cigarettes    Quit date: 2018    Years since quitting: 3 0   Smokeless Tobacco Never Used   Tobacco Comment    1/2 pack daily     Family History   Problem Relation Age of Onset    No Known Problems Mother        Meds/Allergies     (Not in a hospital admission)      No Known Allergies    Objective     Blood pressure (!) 174/109, pulse 85, temperature 98 6 °F (37 °C), temperature source Temporal, resp  rate 21, height 5' 8" (1 727 m), weight 109 kg (240 lb), SpO2 95 %  PHYSICAL EXAMINATION:    General Appearance:   Alert, cooperative, no distress   HEENT:  Normocephalic, atraumatic, anicteric  Neck supple, symmetrical, trachea midline  Lungs:   Equal chest rise and unlabored breathing, normal effort, no coughing  Cardiovascular:   No visualized JVD  Abdomen:   No abdominal distension  Skin:   No jaundice, rashes, or lesions  Musculoskeletal:   Normal range of motion visualized  Psych:  Normal affect and normal insight  Neuro:  Alert and appropriate             ASSESSMENT/PLAN:  This is a 49y o  year old male here for EGD, and he is stable and optimized for his procedure  No abnormalities noted
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
## 2022-04-12 NOTE — BH INPATIENT PSYCHIATRY ASSESSMENT NOTE - NSBHASSESSSUMMFT_PSY_ALL_CORE
Pt presenting increasingly depressed over the past several months, having suicidal thoughts with several plans. Denies SIIP at this moment and able to engage in safety planning on the unit. No evidence of bertha or psychosis (and despite historical dx of Bipolar Disorder, pt's description of manic symptoms in the past are not consistent with that of a true manic episode). Pt endorses some symptoms possibly consistent with borderline personality pathology, though will need further evaluation.     Plan:  1. Admit to [facility], voluntary legal status, transfer to  when bed becomes available  2. Routine Checks  3. Start Lexapro 5mg daily  4. Will expand collateral from therapist and family/friends

## 2022-04-12 NOTE — BH SOCIAL WORK INITIAL PSYCHOSOCIAL EVALUATION - NSPTSTATEDGOAL_PSY_ALL_CORE
Pt wants to be discharged tomorrow so she can return to work on Thursday.  She wants to be put on medication.

## 2022-04-12 NOTE — BH INPATIENT PSYCHIATRY ASSESSMENT NOTE - NSBHMETABOLIC_PSY_ALL_CORE_FT
BMI:   HbA1c:   Glucose:   BP: 107/74 (04-11-22 @ 12:15) (107/74 - 112/75)  Lipid Panel: Date/Time: 04-12-22 @ 09:42  Cholesterol, Serum: 155  Direct LDL: --  HDL Cholesterol, Serum: 66  Total Cholesterol/HDL Ration Measurement: --  Triglycerides, Serum: 43   BMI:   HbA1c:   Glucose:   BP: 122/77 (04-13-22 @ 06:23) (107/74 - 122/77)  Lipid Panel: Date/Time: 04-12-22 @ 09:42  Cholesterol, Serum: 155  Direct LDL: --  HDL Cholesterol, Serum: 66  Total Cholesterol/HDL Ration Measurement: --  Triglycerides, Serum: 43

## 2022-04-12 NOTE — BH INPATIENT PSYCHIATRY ASSESSMENT NOTE - NSBHCHARTREVIEWVS_PSY_A_CORE FT
Vital Signs Last 24 Hrs  T(C): 36.5 (04-12-22 @ 08:22), Max: 36.7 (04-11-22 @ 15:29)  T(F): 97.7 (04-12-22 @ 08:22), Max: 98 (04-11-22 @ 15:29)  HR: --  BP: --  BP(mean): --  RR: --  SpO2: --    Orthostatic VS  04-12-22 @ 08:22  Lying BP: --/-- HR: --  Sitting BP: 101/69 HR: 90  Standing BP: 105/72 HR: 95  Site: upper left arm  Mode: electronic  Orthostatic VS  04-11-22 @ 15:29  Lying BP: --/-- HR: --  Sitting BP: 118/75 HR: 102  Standing BP: 110/78 HR: 100  Site: --  Mode: --   Vital Signs Last 24 Hrs  T(C): 36.9 (04-13-22 @ 06:23), Max: 36.9 (04-13-22 @ 06:23)  T(F): 98.5 (04-13-22 @ 06:23), Max: 98.5 (04-13-22 @ 06:23)  HR: 90 (04-13-22 @ 06:23) (90 - 90)  BP: 122/77 (04-13-22 @ 06:23) (122/77 - 122/77)  BP(mean): --  RR: --  SpO2: --    Orthostatic VS  04-12-22 @ 08:22  Lying BP: --/-- HR: --  Sitting BP: 101/69 HR: 90  Standing BP: 105/72 HR: 95  Site: upper left arm  Mode: electronic  Orthostatic VS  04-11-22 @ 15:29  Lying BP: --/-- HR: --  Sitting BP: 118/75 HR: 102  Standing BP: 110/78 HR: 100  Site: --  Mode: --

## 2022-04-12 NOTE — BH INPATIENT PSYCHIATRY ASSESSMENT NOTE - CURRENT MEDICATION
MEDICATIONS  (STANDING):  escitalopram 5 milliGRAM(s) Oral daily    MEDICATIONS  (PRN):  hydrOXYzine hydrochloride 25 milliGRAM(s) Oral every 6 hours PRN Anxiety  ibuprofen  Tablet. 600 milliGRAM(s) Oral every 8 hours PRN Mild Pain (1 - 3), Moderate Pain (4 - 6)  LORazepam     Tablet 0.5 milliGRAM(s) Oral every 6 hours PRN agitation/ Anxiety  LORazepam   Injectable 1 milliGRAM(s) IntraMuscular Once PRN severe agitation

## 2022-04-12 NOTE — BH INPATIENT PSYCHIATRY ASSESSMENT NOTE - NSSUICPROTFACT_PSY_ALL_CORE
Supportive social network of family or friends/Fear of death or the actual act of killing self/Cultural, spiritual and/or moral attitudes against suicide/Engaged in work or school/Positive therapeutic relationships

## 2022-04-12 NOTE — BH INPATIENT PSYCHIATRY ASSESSMENT NOTE - DESCRIPTION
single and currently living with her friend, Mary (in [facility]).. been living with Mary since 2/2022. goes to trade school ([employer]). has 2 siblings (a half brother whom she is not close with and a 17 yr old sister, Holly. she likes playing videogames - [employer] as well as viewing Lifestyle Vlogging on [email or web address].  Pt is Yazdanism. no reported pets. no reported access to guns. employed part time as a HHA

## 2022-04-12 NOTE — BH INPATIENT PSYCHIATRY ASSESSMENT NOTE - CASE SUMMARY
Pt is a 22yo woman with historical dx of Bipolar Disorder, currently in treatment with a therapist though not seeing a psychiatrist or on any medication, no prior hx of hospitalizations or SAs, who presents for voluntary admission at the urging of her therapist and friends for worsening depression and suicidal thoughts (with several plans). Pt describes low mood, low energy, hypersomnia, anhedonia, hopelessness, and SI (involving plans of jumping off a bridge/drowning herself). Despite dx of bipolar disorder, pt's description of prior manic episodes is not consistent with a true manic episode and dx bipolar I disorder seems unlikely at this time. Pt describes some symptoms consistent with borderline personality pathology, though needs further evaluation. Pt at this time denies SIIP and is able to engage in safety planning, hopeful about treatment, appropriate to be maintained on routine checks. For depression will start Lexapro 5mg daily. Will transfer pt to  today for ongoing care and greatment.
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
## 2022-04-12 NOTE — BH INPATIENT PSYCHIATRY ASSESSMENT NOTE - NSCOMMENTSUICRISKFACT_PSY_ALL_CORE
history of trauma, acute worsening of depressed mood and suicidal ideation, weakening of family support system over last year

## 2022-04-13 PROCEDURE — 90832 PSYTX W PT 30 MINUTES: CPT

## 2022-04-13 PROCEDURE — 90853 GROUP PSYCHOTHERAPY: CPT

## 2022-04-13 PROCEDURE — 99232 SBSQ HOSP IP/OBS MODERATE 35: CPT | Mod: 25

## 2022-04-13 RX ADMIN — Medication 600 MILLIGRAM(S): at 13:30

## 2022-04-13 RX ADMIN — ESCITALOPRAM OXALATE 5 MILLIGRAM(S): 10 TABLET, FILM COATED ORAL at 09:22
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
## 2022-04-13 NOTE — BH INPATIENT PSYCHIATRY PROGRESS NOTE - NSBHMETABOLIC_PSY_ALL_CORE_FT
BMI:   HbA1c:   Glucose:   BP: 122/77 (04-13-22 @ 06:23) (107/74 - 122/77)  Lipid Panel: Date/Time: 04-12-22 @ 09:42  Cholesterol, Serum: 155  Direct LDL: --  HDL Cholesterol, Serum: 66  Total Cholesterol/HDL Ration Measurement: --  Triglycerides, Serum: 43

## 2022-04-13 NOTE — BH INPATIENT PSYCHIATRY PROGRESS NOTE - NSBHCHARTREVIEWVS_PSY_A_CORE FT
Vital Signs Last 24 Hrs  T(C): 36.9 (04-13-22 @ 06:23), Max: 36.9 (04-13-22 @ 06:23)  T(F): 98.5 (04-13-22 @ 06:23), Max: 98.5 (04-13-22 @ 06:23)  HR: 90 (04-13-22 @ 06:23) (90 - 90)  BP: 122/77 (04-13-22 @ 06:23) (122/77 - 122/77)  BP(mean): --  RR: --  SpO2: --    Orthostatic VS  04-12-22 @ 08:22  Lying BP: --/-- HR: --  Sitting BP: 101/69 HR: 90  Standing BP: 105/72 HR: 95  Site: upper left arm  Mode: electronic

## 2022-04-13 NOTE — BH INPATIENT PSYCHIATRY PROGRESS NOTE - NSBHASSESSSUMMFT_PSY_ALL_CORE
Pt presenting increasingly depressed over the past several months, having suicidal thoughts with several plans. Denies SIIP at this moment and able to engage in safety planning on the unit. No evidence of bertha or psychosis (and despite historical dx of Bipolar Disorder, pt's description of manic symptoms in the past are not consistent with that of a true manic episode). Pt endorses some symptoms possibly consistent with borderline personality pathology, though will need further evaluation.     Plan:  1. Admit to Low 3, voluntary legal status, transfer to  when bed becomes available  2. Routine Checks  3. Start Lexapro 5mg daily  4. 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     On unit today patient reviews recent sxs c/w major depressive episode including worsening of active S/I to plan including research on effective methods. Also endorses symptoms c/w Borderline Personality Disorder. She is largely denying symptoms today including passive or active S/I and is discharge focused. Tolerating medications thus far and engaging in therapy.     Given significant depressive symptoms and recent active S/I with plan patient requires continued hospitalization for safety and stabilization.     1. Continue voluntary 9.13 admission  2. Routine observation appropriate as denying current SI; ativan PRN agitation and anxiety  3. Psychiatric  -continue lexapro 5 mg daily for depression; titrate tomorrow if continued tolerance  -atarax PRN anxiety  4. I/G/M therapy with DBT focus  5. Medical: no acute issues  6. Collateral: expand from therapist; obtain consent to speak to family if pt willing  7. Dispo: when stable
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
## 2022-04-14 PROCEDURE — 90853 GROUP PSYCHOTHERAPY: CPT

## 2022-04-14 PROCEDURE — 90832 PSYTX W PT 30 MINUTES: CPT | Mod: 59

## 2022-04-14 PROCEDURE — 99232 SBSQ HOSP IP/OBS MODERATE 35: CPT

## 2022-04-14 RX ORDER — ESCITALOPRAM OXALATE 10 MG/1
10 TABLET, FILM COATED ORAL DAILY
Refills: 0 | Status: DISCONTINUED | OUTPATIENT
Start: 2022-04-15 | End: 2022-04-18

## 2022-04-14 RX ADMIN — ESCITALOPRAM OXALATE 5 MILLIGRAM(S): 10 TABLET, FILM COATED ORAL at 09:43

## 2022-04-14 NOTE — BH TREATMENT PLAN - NSTXCOPEINTERPR_PSY_ALL_CORE
Over the next 7 days, Psychiatric Rehabilitation staff will utilize group and individual psychotherapy, and psychoeducation to assist the patient in reaching established treatment goal.
Over the next 7 days, Psychiatric Rehabilitation staff will utilize group and individual psychotherapy, and psychoeducation to assist the patient in reaching established treatment goal.

## 2022-04-14 NOTE — BH TREATMENT PLAN - NSTXDCOPLKINTERSW_PSY_ALL_CORE
Support and psychoed to be provided and d/c plan to be arranged when appropriate.
Support and psychoed to be provided and d/c plan to be arranged when appropriate.

## 2022-04-14 NOTE — BH TREATMENT PLAN - NSTXSUICIDINTERRN_PSY_ALL_CORE
Assess pt for S/I/I/P and SIB, explore healthy coping skills and protective factors, provide support, maintain safety, identify triggers, encourage pt to participate in groups and unit activities, administer and educate on ordered medications.
Assess for SI and/or I/P to harm self. Maintain patient safety.

## 2022-04-14 NOTE — BH INPATIENT PSYCHIATRY PROGRESS NOTE - NSBHFUPINTERVALHXFT_PSY_A_CORE
Chart reviewed. Case d/w interdisciplinary team. Patient seen and examined. No acute overnight events, no psychiatric PRNs required/requested. Compliant with standing medications. No physical complaints. Slept at 2 am per sleep log-stayed up to help peer.     Today reports improved mood, feeling happier and more energetic. Attributes connection with peers, passage of time, change of scenery and use of DBT skills to improvement in symptoms. Denying passive or active S/I, urges for NSSIB. Good appetite and PO intake. Good motivation, getting some enjoyment from things. Motivated for treatment. Tolerating lexapro and willing to increase dose today.

## 2022-04-14 NOTE — BH INPATIENT PSYCHIATRY PROGRESS NOTE - NSBHCHARTREVIEWVS_PSY_A_CORE FT
Vital Signs Last 24 Hrs  T(C): 36.8 (04-14-22 @ 05:28), Max: 36.8 (04-14-22 @ 05:28)  T(F): 98.3 (04-14-22 @ 05:28), Max: 98.3 (04-14-22 @ 05:28)  HR: --  BP: --  BP(mean): --  RR: --  SpO2: --    Orthostatic VS  04-14-22 @ 05:28  Lying BP: --/-- HR: --  Sitting BP: 114/75 HR: 71  Standing BP: --/-- HR: --  Site: --  Mode: --

## 2022-04-14 NOTE — BH TREATMENT PLAN - NSCMSPTSTRENGTHS_PSY_ALL_CORE
[place]/spirituality/Intact employment/Motivated/Physically healthy/Supportive family
[place]na/spirituality/Intact employment/Motivated/Physically healthy/Supportive family

## 2022-04-14 NOTE — BH INPATIENT PSYCHIATRY PROGRESS NOTE - NSBHASSESSSUMMFT_PSY_ALL_CORE
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     Dx c/w MDD and BPD.    Today continues to report symptom improvement with happier mood, increased engagement, denial of any suicidal ideation, and appreciation for treatment. Tolerating medications thus far and engaging in therapy.     Given significant depressive symptoms and recent active S/I with plan patient requires continued hospitalization for safety and stabilization.     1. Continue voluntary 9.13 admission  2. Routine observation appropriate as denying current SI; ativan PRN agitation and anxiety  3. Psychiatric  -increase lexapro to 10 mg daily for depression  -atarax PRN anxiety  4. I/G/M therapy with DBT focus  5. Medical: no acute issues  6. Collateral: obtained from therapist today (see event note); verbal consent today for friend/roommate Radha  7. Dispo: when stable

## 2022-04-14 NOTE — BH TREATMENT PLAN - NSTXDEPRESINTERMD_PSY_ALL_CORE
lexapro trial, psychoeducation, support therapeutic interventions
lexapro trial, psychoeducation, support therapeutic interventions
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
## 2022-04-14 NOTE — BH PSYCHOLOGY - CLINICIAN PSYCHOTHERAPY NOTE - NSBHPSYCHOLNARRATIVE_PSY_A_CORE FT
Patient was alert, cooperative, and in control. Oriented x3. Casually dressed, fairly groomed. Maintained good eye contact. Speech normal in production, rate, volume, and tone. No abnormal psychomotor behavior. Mood "good" with congruent affect. Thought process logical, goal-directed. Thought content relevant to discussion. Denied auditory/visual hallucinations and current suicidal/homicidal ideation, intent, plan, and urges to self-harm. Committed to remaining safe on the unit and informing staff if unable to manage behaviors. Impulse control intact. Insight and judgment fair.    Patient reported doing well, has been adjusting to the unit, socializing with peers, participating in unit activities and attending groups, expressed improvement in mood, and denied suicidality. Patient denied chronic suicidality, stated that she experiences suicidality when she feels overwhelmed and "burnt out." Patient was able to discuss unhelpful judgments about herself when she is in distress as well as ineffective behaviors and physiological sensations. Discussed Wise Mind, distract, and self-soothe skills. Patient acknowledged feeling supported and  from her stressors while in the hospital, which has contributed to improvement in symptoms. Patient expressed willingness to try skills and practice so that she will be more likely to use skills in the future.

## 2022-04-14 NOTE — BH TREATMENT PLAN - NSTXCAREGIVERPARTICIPATE_PSY_P_CORE
Family/Caregiver participated in identification of needs/problems/goals for treatment
Family/Caregiver participated in defining interventions

## 2022-04-14 NOTE — BH TREATMENT PLAN - NSTXSUICIDGOAL_PSY_ALL_CORE
Will identify and utilize 2 coping skills
Will verbalize a decrease in preoccupation with suicidal thoughts and / or intent to commit suicide to 2 on a 10-point scale

## 2022-04-14 NOTE — BH INPATIENT PSYCHIATRY PROGRESS NOTE - NSBHMETABOLIC_PSY_ALL_CORE_FT
BMI:   HbA1c:   Glucose:   BP: 122/77 (04-13-22 @ 06:23) (122/77 - 122/77)  Lipid Panel: Date/Time: 04-12-22 @ 09:42  Cholesterol, Serum: 155  Direct LDL: --  HDL Cholesterol, Serum: 66  Total Cholesterol/HDL Ration Measurement: --  Triglycerides, Serum: 43

## 2022-04-15 PROCEDURE — 99232 SBSQ HOSP IP/OBS MODERATE 35: CPT

## 2022-04-15 PROCEDURE — 90832 PSYTX W PT 30 MINUTES: CPT | Mod: 59

## 2022-04-15 PROCEDURE — 90853 GROUP PSYCHOTHERAPY: CPT

## 2022-04-15 RX ADMIN — ESCITALOPRAM OXALATE 10 MILLIGRAM(S): 10 TABLET, FILM COATED ORAL at 09:18

## 2022-04-15 NOTE — BH INPATIENT PSYCHIATRY PROGRESS NOTE - NSBHASSESSSUMMFT_PSY_ALL_CORE
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     Dx c/w MDD and BPD.    Today continues to report symptom improvement with good mood, denial of any suicidal ideation, and appreciation for treatment. Does have anxiety related to outside stressors and motivated to create cope ahead. Tolerating medications thus far and engaging in therapy.     Given significant depressive symptoms and recent active S/I with plan patient requires continued hospitalization for safety and stabilization.     1. Continue voluntary 9.13 admission  2. Routine observation appropriate as denying current SI; ativan PRN agitation and anxiety  3. Psychiatric  -continue lexapro 10 mg daily for depression  -atarax PRN anxiety  4. I/G/M therapy with DBT focus  5. Medical: no acute issues  6. Collateral: obtained from therapist (see event note); verbal consent today for friend/roommate Radha  7. Dispo: when stable

## 2022-04-15 NOTE — BH INPATIENT PSYCHIATRY PROGRESS NOTE - NSBHFUPINTERVALHXFT_PSY_A_CORE
Chart reviewed. Case d/w interdisciplinary team. Patient seen and examined. No acute overnight events, no psychiatric PRNs required/requested. Compliant with standing medications. No physical complaints. Slept at 1 am per sleep log-stayed up to help peer. Attending groups.     Today reports continued improvement in mood. Denying passive or active S/I, urges for NSSIB. Good appetite and PO intake. Has anxiety re: life outside of the hospital, during visits and phone calls related to overwhelm around responsibilities and feelings of guilt. Describes mood as 4/5. Focus is good. Describes fluctuation in motivation, enjoyment and enjoyment. Motivated for treatment and practicing DBT skills. Tolerating lexapro and willing to increase dose today.

## 2022-04-15 NOTE — BH PSYCHOLOGY - CLINICIAN PSYCHOTHERAPY NOTE - NSBHPSYCHOLNARRATIVE_PSY_A_CORE FT
Patient was alert, cooperative, and in control. Oriented x3. Casually dressed, fairly groomed. Maintained good eye contact. Speech normal in production, rate, volume, and tone. No abnormal psychomotor behavior. Mood "good" with congruent affect. Thought process logical, goal-directed. Thought content relevant to discussion. Denied auditory/visual hallucinations and current suicidal/homicidal ideation, intent, plan, and urges to self-harm. Committed to remaining safe on the unit and informing staff if unable to manage behaviors. Impulse control intact. Insight and judgment fair.    Patient reported that she continues to do well, has been socializing with peers, attending group, and practicing skills. Patient believes she has benefited from medication adjustments. She expressed some anxiety about returning home and having to manage her outpatient stressors. She expressed feeling anxiety when interacting with her mother, as she is "old school and doesn't understand medications." Discussed Check the Facts and Dear Man. Patient was found the skills helpful and agreed to practice.

## 2022-04-15 NOTE — BH INPATIENT PSYCHIATRY PROGRESS NOTE - NSBHCHARTREVIEWVS_PSY_A_CORE FT
Vital Signs Last 24 Hrs  T(C): 36.4 (04-15-22 @ 06:43), Max: 36.4 (04-15-22 @ 06:43)  T(F): 97.5 (04-15-22 @ 06:43), Max: 97.5 (04-15-22 @ 06:43)  HR: 91 (04-15-22 @ 06:43) (91 - 91)  BP: 129/84 (04-15-22 @ 06:43) (129/84 - 129/84)  BP(mean): --  RR: --  SpO2: --    Orthostatic VS  04-14-22 @ 05:28  Lying BP: --/-- HR: --  Sitting BP: 114/75 HR: 71  Standing BP: --/-- HR: --  Site: --  Mode: --

## 2022-04-15 NOTE — BH INPATIENT PSYCHIATRY PROGRESS NOTE - NSBHMETABOLIC_PSY_ALL_CORE_FT
BMI:   HbA1c:   Glucose:   BP: 129/84 (04-15-22 @ 06:43) (122/77 - 129/84)  Lipid Panel: Date/Time: 04-12-22 @ 09:42  Cholesterol, Serum: 155  Direct LDL: --  HDL Cholesterol, Serum: 66  Total Cholesterol/HDL Ration Measurement: --  Triglycerides, Serum: 43

## 2022-04-16 PROCEDURE — 99232 SBSQ HOSP IP/OBS MODERATE 35: CPT

## 2022-04-16 RX ADMIN — ESCITALOPRAM OXALATE 10 MILLIGRAM(S): 10 TABLET, FILM COATED ORAL at 08:55

## 2022-04-16 NOTE — BH INPATIENT PSYCHIATRY PROGRESS NOTE - NSBHMETABOLIC_PSY_ALL_CORE_FT
BMI:   HbA1c:   Glucose:   BP: 132/90 (04-16-22 @ 06:32) (129/84 - 132/90)  Lipid Panel: Date/Time: 04-12-22 @ 09:42  Cholesterol, Serum: 155  Direct LDL: --  HDL Cholesterol, Serum: 66  Total Cholesterol/HDL Ration Measurement: --  Triglycerides, Serum: 43   BMI:   HbA1c:   Glucose:   BP: 145/86 (04-17-22 @ 06:50) (129/84 - 145/86)  Lipid Panel: Date/Time: 04-12-22 @ 09:42  Cholesterol, Serum: 155  Direct LDL: --  HDL Cholesterol, Serum: 66  Total Cholesterol/HDL Ration Measurement: --  Triglycerides, Serum: 43

## 2022-04-16 NOTE — BH INPATIENT PSYCHIATRY PROGRESS NOTE - NSBHASSESSSUMMFT_PSY_ALL_CORE
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     Dx c/w MDD and BPD.    Today continues to report symptom improvement with good mood, denial of any suicidal ideation, and appreciation for treatment. Does have anxiety related to outside stressors and motivated to create cope ahead. Tolerating medications thus far and engaging in therapy.     Given significant depressive symptoms and recent active S/I with plan patient requires continued hospitalization for safety and stabilization.     1. Continue voluntary 9.13 admission  2. Routine observation appropriate as denying current SI; ativan PRN agitation and anxiety  3. Psychiatric  -continue lexapro 10 mg daily for depression  -atarax PRN anxiety  4. I/G/M therapy with DBT focus  5. Medical: no acute issues  6. Collateral: obtained from therapist (see event note); verbal consent today for friend/roommate Radha  7. Dispo: when stable

## 2022-04-16 NOTE — BH INPATIENT PSYCHIATRY PROGRESS NOTE - NSBHFUPINTERVALHXFT_PSY_A_CORE
Chart reviewed. Case d/w nursing team. Patient seen and examined. No acute overnight events, no psychiatric PRNs required/requested. Compliant with standing medications. No physical complaints.    Today reports continued improvement in mood. Denying passive or active S/I, urges for NSSIB. Good appetite and PO intake. Has anxiety re: life outside of the hospital, during visits and phone calls related to overwhelm around responsibilities and feelings of guilt. Describes mood as 4/5. Focus is good. Describes fluctuation in motivation, enjoyment and enjoyment. Motivated for treatment and practicing DBT skills. Tolerating lexapro and willing to increase dose today. Chart reviewed. Case d/w nursing team. Patient seen and examined. No acute overnight events, no psychiatric PRNs required/requested. Compliant with standing medications. No physical complaints.    Today reports continued improvement in mood. Denying passive or active S/I, urges for NSSIB. Good appetite and PO intake. Has anxiety re: life outside of the hospital, during visits and phone calls related to overwhelm around responsibilities and feelings of guilt. Describes mood as 4/5. Focus is good. Describes fluctuation in motivation, enjoyment and enjoyment. Motivated for treatment and practicing DBT skills. Tolerating Lexapro.

## 2022-04-16 NOTE — BH INPATIENT PSYCHIATRY PROGRESS NOTE - NSBHCHARTREVIEWVS_PSY_A_CORE FT
Vital Signs Last 24 Hrs  T(C): 36.6 (04-16-22 @ 06:32), Max: 37.1 (04-15-22 @ 20:27)  T(F): 97.9 (04-16-22 @ 06:32), Max: 98.8 (04-15-22 @ 20:27)  HR: 90 (04-16-22 @ 06:32) (90 - 90)  BP: 132/90 (04-16-22 @ 06:32) (132/90 - 132/90)  BP(mean): --  RR: 20 (04-16-22 @ 06:32) (20 - 20)  SpO2: --     Vital Signs Last 24 Hrs  T(C): 36.7 (04-17-22 @ 06:50), Max: 37.3 (04-16-22 @ 21:36)  T(F): 98.1 (04-17-22 @ 06:50), Max: 99.1 (04-16-22 @ 21:36)  HR: 92 (04-17-22 @ 06:50) (92 - 92)  BP: 145/86 (04-17-22 @ 06:50) (145/86 - 145/86)  BP(mean): --  RR: --  SpO2: --

## 2022-04-17 PROCEDURE — 99232 SBSQ HOSP IP/OBS MODERATE 35: CPT

## 2022-04-17 RX ADMIN — ESCITALOPRAM OXALATE 10 MILLIGRAM(S): 10 TABLET, FILM COATED ORAL at 09:26

## 2022-04-17 NOTE — BH INPATIENT PSYCHIATRY PROGRESS NOTE - NSBHCHARTREVIEWVS_PSY_A_CORE FT
Vital Signs Last 24 Hrs  T(C): 36.7 (04-17-22 @ 06:50), Max: 37.3 (04-16-22 @ 21:36)  T(F): 98.1 (04-17-22 @ 06:50), Max: 99.1 (04-16-22 @ 21:36)  HR: 92 (04-17-22 @ 06:50) (92 - 92)  BP: 145/86 (04-17-22 @ 06:50) (145/86 - 145/86)  BP(mean): --  RR: --  SpO2: --

## 2022-04-17 NOTE — BH INPATIENT PSYCHIATRY PROGRESS NOTE - NSBHASSESSSUMMFT_PSY_ALL_CORE
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     Dx c/w MDD and BPD.    Today continues to report symptom improvement with good mood, denial of any suicidal ideation, and appreciation for treatment. Does have anxiety related to outside stressors and motivated to create cope ahead. Tolerating medications thus far and engaging in therapy.     Given significant depressive symptoms and recent active S/I with plan patient requires continued hospitalization for safety and stabilization.     1. Continue voluntary 9.13 admission  2. Routine observation appropriate as denying current SI; ativan PRN agitation and anxiety  3. Psychiatric  -continue lexapro 10 mg daily for depression  -atarax PRN anxiety  4. I/G/M therapy with DBT focus  5. Medical: no acute issues  6. Collateral: obtained from therapist (see event note); verbal consent today for friend/roommate Radha  7. Dispo: when stable

## 2022-04-17 NOTE — BH INPATIENT PSYCHIATRY PROGRESS NOTE - NSBHFUPINTERVALHXFT_PSY_A_CORE
Chart reviewed. Case d/w nursing team. Patient seen and examined. No acute overnight events, no psychiatric PRNs required/requested. Compliant with standing medications. No physical complaints.    Today reports continued improvement in mood.  She is participating in unit activities and groups. Denying passive or active S/I, urges for NSSIB. Good appetite and PO intake. Has anxiety re: life outside of the hospital, during visits and phone calls related to overwhelm around responsibilities and feelings of guilt. Describes mood as 4/5. Focus is good. Describes fluctuation in motivation, enjoyment and enjoyment. Motivated for treatment and practicing DBT skills. Tolerating Lexapro.

## 2022-04-17 NOTE — BH INPATIENT PSYCHIATRY PROGRESS NOTE - NSBHMETABOLIC_PSY_ALL_CORE_FT
BMI:   HbA1c:   Glucose:   BP: 145/86 (04-17-22 @ 06:50) (129/84 - 145/86)  Lipid Panel: Date/Time: 04-12-22 @ 09:42  Cholesterol, Serum: 155  Direct LDL: --  HDL Cholesterol, Serum: 66  Total Cholesterol/HDL Ration Measurement: --  Triglycerides, Serum: 43

## 2022-04-18 PROCEDURE — 99232 SBSQ HOSP IP/OBS MODERATE 35: CPT | Mod: 25

## 2022-04-18 PROCEDURE — 90832 PSYTX W PT 30 MINUTES: CPT

## 2022-04-18 PROCEDURE — 90853 GROUP PSYCHOTHERAPY: CPT

## 2022-04-18 RX ORDER — ESCITALOPRAM OXALATE 10 MG/1
15 TABLET, FILM COATED ORAL DAILY
Refills: 0 | Status: DISCONTINUED | OUTPATIENT
Start: 2022-04-19 | End: 2022-04-20

## 2022-04-18 RX ORDER — ESCITALOPRAM OXALATE 10 MG/1
5 TABLET, FILM COATED ORAL ONCE
Refills: 0 | Status: COMPLETED | OUTPATIENT
Start: 2022-04-18 | End: 2022-04-18

## 2022-04-18 RX ADMIN — ESCITALOPRAM OXALATE 10 MILLIGRAM(S): 10 TABLET, FILM COATED ORAL at 08:44

## 2022-04-18 RX ADMIN — ESCITALOPRAM OXALATE 5 MILLIGRAM(S): 10 TABLET, FILM COATED ORAL at 13:53

## 2022-04-18 NOTE — BH INPATIENT PSYCHIATRY PROGRESS NOTE - NSBHMETABOLIC_PSY_ALL_CORE_FT
BMI:   HbA1c:   Glucose:   BP: 121/81 (04-18-22 @ 07:25) (121/81 - 145/86)  Lipid Panel: Date/Time: 04-12-22 @ 09:42  Cholesterol, Serum: 155  Direct LDL: --  HDL Cholesterol, Serum: 66  Total Cholesterol/HDL Ration Measurement: --  Triglycerides, Serum: 43

## 2022-04-18 NOTE — BH INPATIENT PSYCHIATRY PROGRESS NOTE - NSBHFUPINTERVALHXFT_PSY_A_CORE
Chart reviewed. Case d/w interdisciplinary team. Patient seen and examined. No acute overnight events, no psychiatric PRNs required/requested. Compliant with standing medications. No physical complaints. Slept at midnight per log. Attending groups.     Today patient reports some challenges this weekend in context of father reaching out to her via phone and requesting to visit. Was able to set boundaries with their interaction but with resultant anxiety in context of fear of effects on Mom and sister at home. Otherwise mood is described as "okay," with 50% improvement in motivation. Is able to enjoy things and engage more than prior. Still with anticipatory anxiety re: discharge, working on cope ahead with therapist. Attending groups, liking DBT skills-most recently opposite action. Asks about increase in lexapro to address motivation and feeling of emptiness-psychoeducation provided.     Good appetite and PO intake. Sleeping well with adequate energy. No c/f medication side effects.

## 2022-04-18 NOTE — BH PSYCHOLOGY - CLINICIAN PSYCHOTHERAPY NOTE - NSBHPSYCHOLNARRATIVE_PSY_A_CORE FT
Patient was alert, cooperative, and in control. Oriented x3. Casually dressed, fairly groomed. Maintained good eye contact. Speech normal in production, rate, volume, and tone. No abnormal psychomotor behavior. Mood "good" with congruent affect. Thought process logical, goal-directed. Thought content relevant to discussion. Denied auditory/visual hallucinations and current suicidal/homicidal ideation, intent, plan, and urges to self-harm. Committed to remaining safe on the unit and informing staff if unable to manage behaviors. Impulse control intact. Insight and judgment fair.    Patient reported doing well over the weekend despite intrusive peers and acuity on the unit, continues to socialize with peers, attending groups, and developing skills. Overall, she feels prepared for discharge, although expressed some anxiety regarding interactions with her father. Session focused on developing cope ahead plan. Patient was able to identify problematic internal experiences and behaviors as well as effective ways to cope. Engaged in imaginal exposure in session. Patient found exercise helpful and agreed to practice.

## 2022-04-18 NOTE — BH INPATIENT PSYCHIATRY PROGRESS NOTE - NSBHCHARTREVIEWVS_PSY_A_CORE FT
Vital Signs Last 24 Hrs  T(C): 36.6 (04-18-22 @ 07:25), Max: 36.9 (04-17-22 @ 20:41)  T(F): 97.9 (04-18-22 @ 07:25), Max: 98.4 (04-17-22 @ 20:41)  HR: 68 (04-18-22 @ 07:25) (68 - 68)  BP: 121/81 (04-18-22 @ 07:25) (121/81 - 121/81)  BP(mean): --  RR: --  SpO2: --

## 2022-04-18 NOTE — BH INPATIENT PSYCHIATRY PROGRESS NOTE - NSBHASSESSSUMMFT_PSY_ALL_CORE
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     Dx c/w MDD and BPD.    Today continues to report symptom improvement with good mood, denial of any suicidal ideation, and appreciation for treatment. Does have anxiety related to outside stressors including family relationships working on cope ahead. Other than anxiety main complaint is amotivation. Tolerating medications thus far and engaging in therapy.     Given depressive symptoms and recent active S/I with plan patient requires continued hospitalization for safety and stabilization.     1. Continue voluntary 9.13 admission  2. Routine observation appropriate as denying current SI; ativan PRN agitation and anxiety  3. Psychiatric  -increase lexapro to 15 mg daily for depression and anxiety  -atarax PRN anxiety  4. I/G/M therapy with DBT focus  5. Medical: no acute issues  6. Collateral: obtained from therapist (see event note); SW spoke with sister and friend/roommate Radha  7. Dispo: when stable

## 2022-04-19 PROCEDURE — 99232 SBSQ HOSP IP/OBS MODERATE 35: CPT

## 2022-04-19 PROCEDURE — 90853 GROUP PSYCHOTHERAPY: CPT

## 2022-04-19 PROCEDURE — 90832 PSYTX W PT 30 MINUTES: CPT | Mod: 59

## 2022-04-19 RX ORDER — ESCITALOPRAM OXALATE 10 MG/1
3 TABLET, FILM COATED ORAL
Qty: 90 | Refills: 0
Start: 2022-04-19 | End: 2022-05-18

## 2022-04-19 RX ADMIN — ESCITALOPRAM OXALATE 15 MILLIGRAM(S): 10 TABLET, FILM COATED ORAL at 09:01

## 2022-04-19 NOTE — BH PSYCHOLOGY - GROUP THERAPY NOTE - NSBHPTASSESSDT_PSY_A_CORE
18-Apr-2022 11:15
19-Apr-2022 17:15
19-Apr-2022 11:00
13-Apr-2022 09:15
12-Apr-2022 11:30
15-Apr-2022 11:15
14-Apr-2022 11:00

## 2022-04-19 NOTE — BH PSYCHOLOGY - GROUP THERAPY NOTE - NSPSYCHOLGRPDBTPT_PSY_A_CORE FT
DBT skills generalization group is a group in which patients review the skill taught the day before, and patients have the opportunity to troubleshoot the skill, engage in more practice, and share their experience using the skill. Today’s skills review group focused on the skill of Accumulating Positive Emotions in the Long Term by identifying values and translating those into goals and manageable action steps. Patients shared values that are important to them and how these translate into goals, as well as how they’ve begun to implement these.  
DBT Group is a group in which patients learn skills to better manage their emotions and behaviors. Group begins with a mindfulness practice so that patients have an opportunity to practice observing their internal and external experiences.  Following the mindfulness exercise the group learns new skills in a didactic format. Group today focused on the “emotion regulation” module.  Specifically, patients learned the skills of “check the facts,” and “opposite action.” “Check the facts” is about being more realistic about interpretations and assumptions and challenging them appropriately to think more rationally, and “opposite action” involves acting opposite to problematic urges that come with emotions.  provided an example of checking the facts, and patients were encouraged to think about how these skills, and were assigned homework to practice.  
DBT Group is a group in which patients learn skills to better manage their emotions and behaviors. Group begins with a mindfulness practice so that patients have an opportunity to practice observing their internal and external experiences.  Following the mindfulness exercise the group learns new skills in a didactic format. Group today focused on the “distress tolerance” module, which are skills to help patients manage their crisis urges.  Specifically, pts learned the skill of “radical acceptance,” which includes accepting painful situations in their lives they cannot change through turning the mind and practicing willingness. Patients were asked to determine difficult situations in their lives they needed to work on accepting, and provided examples of ways to practice this while in the hospital. 
DBT Group is a group in which patients learn skills to better manage their emotions and behaviors. Group today focused on the “mindfulness” module, which are skills to help patients increase their awareness of their present experience without judgment. Pts were taught the “what” skills (observe, describe, participate) and “how” skills (non-judgmentally, effectively, and one-mindfully) of mindfulness, and engaged in a variety of mindfulness exercises to practice the skills, and shared observations at the end of each activity. 
DBT Group is a group in which patients learn skills to better manage their emotions and behaviors. Group begins with a mindfulness practice so that patients have an opportunity to practice observing their internal and external experiences.  Following the mindfulness exercise the group learns new skills in a didactic format. Group today focused on the “emotion regulation” module.  Specifically, patients learned Build Mastery and [place] and [place] A[employer]. Patients were asked to identify an activity to engage in every day that would help increase their sense of competence and accomplishment (Build Mastery). They were also asked to identify potential difficult situations, identify skills to help manage these difficulties, and imagine coping effectively ([place]).

## 2022-04-19 NOTE — BH DISCHARGE NOTE NURSING/SOCIAL WORK/PSYCH REHAB - NSDCADDINFO1FT_PSY_ALL_CORE
Please be informed that you will be receiving a call from our registration team the day before to get consent for treatment and to verify demographic information. Please be aware that if not available to do this process, the intake could be delayed or even postponed.

## 2022-04-19 NOTE — BH PSYCHOLOGY - CLINICIAN PSYCHOTHERAPY NOTE - NSBHPSYCHOLASSESSPROV_PSY_A_CORE
Licensed Psychologist

## 2022-04-19 NOTE — BH DISCHARGE NOTE NURSING/SOCIAL WORK/PSYCH REHAB - DISCHARGE INSTRUCTIONS AFTERCARE APPOINTMENTS
In order to check the location, date, or time of your aftercare appointment, please refer to your Discharge Instructions Document given to you upon leaving the hospital.  If you have lost the instructions please call [phone]

## 2022-04-19 NOTE — BH INPATIENT PSYCHIATRY DISCHARGE NOTE - HOSPITAL COURSE
Dates of Hospitalization: 4/11-4/20/22    On admission interview, patient presented with the following signs and symptoms:      Patient was started on medication X which was titrated to a dose of Y with good effect and tolerability.      Patient’s symptoms gradually improved over the course of the hospitalization.      There were no behavioral problems on the unit.  Patient did not become agitated and did not require emergent intramuscular medications or seclusion / restraints.  Patient did not self-harm on the unit.  Patient remained actively engaged in treatment.  Patient participated in individual, group, and milieu therapy.  Patient got along appropriately with staff and peers.  A family meeting was held at time of admission to provide psycho-education and to collaboratively treatment plan.  A second family meeting was held prior to discharge for safety planning and disposition planning.      On day of discharge, the patient has improved significantly and no longer requires inpatient treatment and care. Patient denies all suicidal and aggressive ideation, intent and plan. Patient denies anxiety symptoms and panic attacks. Patient is not judged to be an acute danger to self or others at this time.    Risk Assessment:  The patient is at chronic risk of suicide given history of depression, anxiety, BPD, S/I, trauma, conflict with parental support system, and now inpatient psychiatric hospitalization. Chronic protective factors include lack of suicide attempts, non-suicidal self-injury, substance abuse, violence, peer supports and ability to form therapeutic relationships.     On admission patient was felt to be at an acutely elevated risk of  suicide given worsening depressive symptoms, active suicidal ideation with plan, intent and preparation. Over the course of treatment patient reported improvement in mood, motivation, hopelessness, anhedonia, and resolution of passive and active S/I. She was engaged in treatment and attended to ADLs. At discharge she is future oriented in multiple spheres including treatment, work, school and relationships; and she created a strong safety plan with her therapist.     Given the above the patient is no longer felt to be at an acutely elevated risk of suicide and is stable for discharge and transition to outpatient level of care.    Patient will be discharged with the following DSM5 Diagnoses:  MDD, recurrent  Borderline Personality Disorder    Patient will be discharged on the following medications:     Dates of Hospitalization: 4/11-4/20/22    On admission interview, patient presented with the following signs and symptoms: chronic depression and suicidal ideation since age 18, with mostly passive S/I, and symptoms worsening since January and peaking in April with amotivation, sadness, increased sleep, decreased energy, anhedonia, variable appetite, tearfulness, hopelessness, feelings of guilt and worthlessness,  escalation of S/I from passive to active with thoughts of hanging herself or jumping off of a dock. Did research on the most effective ways to kill yourself. Also reported chronic mood lability, fear of abandonment, identity diffusion, chronic S/I, impulsivity, emptiness.    Patient was started on Lexapro for depression which was titrated to a dose of 15 mg with good effect and tolerability.      Patient’s symptoms gradually improved over the course of the hospitalization.  Patient reported improved in mood, sleep, engagement, appetite and PO intake, hopelessness, guilt, worthlessness. Described remission in both passive and active S/I. Motivation and sleep remained lower than baseline but improved from admission. Towards the end of the course she described anxiety related to returning to work, school and familial stress-with recent legal issues with Dad being a major stressor. She was able to complete a strong safety plan with her therapist as well as a cope ahead plan.     There were no behavioral problems on the unit.  Patient did not become agitated and did not require emergent intramuscular medications or seclusion / restraints.  Patient did not self-harm on the unit.  Patient remained actively engaged in treatment.  Patient participated in individual, group, and milieu therapy.  Patient got along appropriately with staff and peers.  A family meeting was held at time of admission to provide psycho-education and to collaboratively treatment plan.  A second family meeting was held prior to discharge for safety planning and disposition planning.      On day of discharge, the patient has improved significantly and no longer requires inpatient treatment and care. Patient denies all suicidal and aggressive ideation, intent and plan. Patient denies anxiety symptoms and panic attacks. Patient is not judged to be an acute danger to self or others at this time.    Risk Assessment:  The patient is at chronic risk of suicide given history of depression, anxiety, BPD, S/I, trauma, conflict with parental support system, and now inpatient psychiatric hospitalization. Chronic protective factors include lack of suicide attempts, non-suicidal self-injury, substance abuse, violence, peer supports and ability to form therapeutic relationships.     On admission patient was felt to be at an acutely elevated risk of  suicide given worsening depressive symptoms, active suicidal ideation with plan, intent and preparation. Over the course of treatment patient reported improvement in mood, motivation, hopelessness, anhedonia, and resolution of passive and active S/I. She was engaged in treatment and attended to ADLs. At discharge she is future oriented in multiple spheres including treatment, work, school and relationships; and she created a strong safety plan with her therapist.     Given the above the patient is no longer felt to be at an acutely elevated risk of suicide and is stable for discharge and transition to outpatient level of care.    Patient will be discharged with the following DSM5 Diagnoses:  MDD, recurrent  Borderline Personality Disorder    Patient will be discharged on the following medications:     Dates of Hospitalization: 4/11-4/20/22    On admission interview, patient presented with the following signs and symptoms: chronic depression and suicidal ideation since age 18, with mostly passive S/I, and symptoms worsening since January and peaking in April with amotivation, sadness, increased sleep, decreased energy, anhedonia, variable appetite, tearfulness, hopelessness, feelings of guilt and worthlessness,  escalation of S/I from passive to active with thoughts of hanging herself or jumping off of a dock. Did research on the most effective ways to kill yourself. Also reported chronic mood lability, fear of abandonment, identity diffusion, chronic S/I, impulsivity, emptiness.    Patient was started on Lexapro for depression which was titrated to a dose of 15 mg with good effect and tolerability.      Patient’s symptoms gradually improved over the course of the hospitalization.  Patient reported improved in mood, sleep, engagement, appetite and PO intake, hopelessness, guilt, worthlessness. Described remission in both passive and active S/I. Motivation and sleep remained lower than baseline but improved from admission. Towards the end of the course she described anxiety related to returning to work, school and familial stress-with recent legal issues with Dad being a major stressor. She was able to complete a strong safety plan with her therapist as well as a cope ahead plan.     There were no behavioral problems on the unit.  Patient did not become agitated and did not require emergent intramuscular medications or seclusion / restraints.  Patient did not self-harm on the unit.  Patient remained actively engaged in treatment.  Patient participated in individual, group, and milieu therapy.  Patient got along appropriately with staff and peers.  Team was in contact with patient's sister and roommate to provide psycho-education and to collaboratively treatment plan.  Sister and roommate were contacted prior to discharge for safety planning and disposition planning.      On day of discharge, the patient has improved significantly and no longer requires inpatient treatment and care. Patient denies all suicidal and aggressive ideation, intent and plan. Patient denies panic attacks and is attending to ADLs. Patient is not judged to be an acute danger to self or others at this time.    Risk Assessment:  The patient is at chronic risk of suicide given history of depression, anxiety, BPD, S/I, trauma, conflict with parental support system, and now inpatient psychiatric hospitalization. Chronic protective factors include lack of suicide attempts, non-suicidal self-injury, substance abuse, violence, peer supports and ability to form therapeutic relationships.     On admission patient was felt to be at an acutely elevated risk of  suicide given worsening depressive symptoms, active suicidal ideation with plan, intent and preparation. Over the course of treatment patient reported improvement in mood, motivation, hopelessness, anhedonia, and resolution of passive and active S/I. She was engaged in treatment and attended to ADLs. At discharge she is future oriented in multiple spheres including treatment, work, school and relationships; and she created a strong safety plan with her therapist.     Given the above the patient is no longer felt to be at an acutely elevated risk of suicide and is stable for discharge and transition to outpatient level of care.    Patient will be discharged with the following DSM5 Diagnoses:  MDD, recurrent  Borderline Personality Disorder    Patient will be discharged on the following medications:  Lexapro 15 mg daily

## 2022-04-19 NOTE — BH PSYCHOLOGY - GROUP THERAPY NOTE - NSBHPSYCHOLRESPONSE_PSY_A_CORE
pt reports 2 months lower abdominal daily without obvious inciting factors. Reports constipation with bm q1-2 wks. about same time as abdominal pain. Denies weight loss, nausea, vomiting or other UGI symptoms. Reports no rectal bleeding. Reports had started OCP pills prior. Denies other complaints.
Coping skills acquired/Insight displayed/Accepted support
Accepted support
Coping skills acquired/Accepted support
Coping skills acquired/Accepted support
Symptoms reduced/Coping skills acquired/Accepted support
Accepted support
Insight displayed/Accepted support

## 2022-04-19 NOTE — BH DISCHARGE NOTE NURSING/SOCIAL WORK/PSYCH REHAB - NSDCPRGOAL_PSY_ALL_CORE
During the current hospitalization, patient has been addressing psychiatric rehabilitation goals pertaining to identifying and utilizing coping skills that meets patient's needs. Patient has demonstrated progress towards psychiatric rehabilitation goals during the current hospitalization. Patient exhibited progress through participating in individual and group therapy and developing additional coping skills to assist with managing negative emotions such as grounding techniques, going for walks outside, journaling, opposite action, and checking the facts. Writer encouraged patient to continue to strengthen and practice effective skills. Patient was receptive. Patient met goal of identifying coping skills by reporting these skills have helped her during current hospitalization. Patient reports she will continue to practice coping skills. Patient reports feeling "grateful" for the treatment she received at the hospital. Patient reports overall improvement in mood, and decrease in intrusive thoughts. Patient reports feeling "happy" to go home. Patient reports she is looking forward to having dinner with her friends, and going back to her life. Writer provided encouragement and validation. Patient was receptive. Patient completed safety plan with unit psychologist. Patient was compliant with medications during current hospitalization. Patient was visible on the unit and was appropriate with peers and staff. Patient was provided with a Press Ganey survey prior to discharge.

## 2022-04-19 NOTE — BH PSYCHOLOGY - CLINICIAN PSYCHOTHERAPY NOTE - NSTXDEPRESGOAL_PSY_ALL_CORE
Will identify 2 coping skills that assist in improving mood

## 2022-04-19 NOTE — BH PSYCHOLOGY - GROUP THERAPY NOTE - NSPSYCHOLGRPGENGOAL_PSY_A_CORE
improve social/vocational/coping skills/psychoeducation
assessment/decrease symptoms/improve level of independent functioning/improve social/vocational/coping skills/psychoeducation/treatment compliance

## 2022-04-19 NOTE — BH PSYCHOLOGY - GROUP THERAPY NOTE - NSPSYCHOLGRPGENPT_PSY_A_CORE FT
Patient attended the cognitive behavior therapy group session. Group session focused on the topic of changing patterns of limited thinking.  Discussion revolved around the eight different patterns as well as identifying the group's recognition of their own patterns of limited thinking.  Group expectations and guidelines, as well as confidentiality and its limitations, were addressed. Principles of cognitive behavior therapy related to today's group topic were reviewed and discussed.  Group members illustrated understanding of these concepts by giving personal examples based on their current experiences. Discussions stemmed from the group topics to the causal connection between thoughts, feelings, and behaviors.
Project Flex is an ACT-based group in which patients learn skills and explore themes of identity, compassion, resilience, and connection through the lens of marginalized identity. Group begins with setting group expectations and introductions that focus on identity exploration. Following introductions, the daily theme is presented through both didactics and experiential activities. Group today focused on the theme “identity.” Patients were provided psychoeducation about identity and engaged in discussion about types of identity and the importance of values on identity formation.  provided experiential activities that allowed patients to engage in their own values exploration, determining the unique values that help to form their identities.

## 2022-04-19 NOTE — BH DISCHARGE NOTE NURSING/SOCIAL WORK/PSYCH REHAB - NSBHDCAGENCY2FT_PSY_A_CORE
[facility] BHCP Intake Part 2 with Dr. Vanesa Albarran

## 2022-04-19 NOTE — BH INPATIENT PSYCHIATRY PROGRESS NOTE - NSBHASSESSSUMMFT_PSY_ALL_CORE
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     Dx c/w MDD and BPD.    Today continues to report symptom improvement with good mood, denial of any suicidal ideation, and appreciation for treatment. Reports decrease in anxiety related to outside stressors in context of cope ahead and upcoming connection to outpatient care. Tolerating medications thus far and engaging in therapy.     Patient requires continued hospitalization for safety and safe d/c plan.     1. Continue voluntary 9.13 admission  2. Routine observation appropriate as denying current SI; ativan PRN agitation and anxiety  3. Psychiatric  -continue lexapro 15 mg daily for depression and anxiety  -atarax PRN anxiety  4. I/G/M therapy with DBT focus  5. Medical: no acute issues  6. Collateral: obtained from therapist (see event note); SW spoke with sister and friend/roommate Radha  7. Dispo: when stable-tomorrow with f/u in BHCP if gains maintained

## 2022-04-19 NOTE — BH INPATIENT PSYCHIATRY DISCHARGE NOTE - NSBHDCHANDOFFFT_PSY_ALL_CORE
Handoff provided via email to Dr. Stone and Dr. Albarran  at [facility] including information re: presentation, hospital course, and future treatment recommendations. Aware of how to reach Dr. John on 1S at [phone] if questions emerge.

## 2022-04-19 NOTE — BH PSYCHOLOGY - GROUP THERAPY NOTE - NSBHPSYCHOLGRPNAME_PSY_A_CORE
DBT Skills
Developing Social Supports
DBT Skills
DBT Skills
DBT Homework
DBT Skills
CBT (Cognitive Behavioral Therapy) Group

## 2022-04-19 NOTE — BH DISCHARGE NOTE NURSING/SOCIAL WORK/PSYCH REHAB - NSCDUDCCRISIS_PSY_A_CORE
[facility]  1 [phone]) [facility] ([phone])  Text "WELL" to [phone]  Website: www.Compare And Share/.[employer]iz[employer] 1 (339) 721-GNFH (4918) Website: www.safehorizon.org/.National Suicide Prevention Lifeline 8 (226) 338-6809/.  [employer]  1 (282) LIFENET (666-6497)/.  [facility]  [phone] [place] [address] [phone]   Hours:  Monday through Friday from 9 AM to 3 PM/.  [place] Dept of  Affairs - Veterans Crisis Line  [phone], Option 1

## 2022-04-19 NOTE — BH INPATIENT PSYCHIATRY DISCHARGE NOTE - NSBHMETABOLIC_PSY_ALL_CORE_FT
BMI:   HbA1c:   Glucose:   BP: 118/69 (04-19-22 @ 06:39) (118/69 - 145/86)  Lipid Panel: Date/Time: 04-12-22 @ 09:42  Cholesterol, Serum: 155  Direct LDL: --  HDL Cholesterol, Serum: 66  Total Cholesterol/HDL Ration Measurement: --  Triglycerides, Serum: 43
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
## 2022-04-19 NOTE — BH PSYCHOLOGY - CLINICIAN PSYCHOTHERAPY NOTE - NSBHPSYCHOLNARRATIVE_PSY_A_CORE FT
Patient was alert, cooperative, and in control. Oriented x3. Casually dressed, fairly groomed. Maintained good eye contact. Speech normal in production, rate, volume, and tone. No abnormal psychomotor behavior. Mood "good" with congruent affect. Thought process logical, goal-directed. Thought content relevant to discussion. Denied auditory/visual hallucinations and current suicidal/homicidal ideation, intent, plan, and urges to self-harm. Committed to remaining safe on the unit and informing staff if unable to manage behaviors. Impulse control intact. Insight and judgment fair.    Patient reported that she continues to do well, has benefited from hospitalization, and feels prepared to manage outpatient stressors effectively. Patient expressed an appropriate level of discharge related anxiety and was able to discuss effective coping skills to manage emotions. Session focused on safety planning. Patient was able to identify appropriate items for each section. Please see Patient Safety Plan for further details.

## 2022-04-19 NOTE — BH DISCHARGE NOTE NURSING/SOCIAL WORK/PSYCH REHAB - NSDCPRRECOMMEND_PSY_ALL_CORE
Psychiatric rehabilitation staff recommends patient will benefit from attending [facility] for medication management, support, and psychotherapy

## 2022-04-19 NOTE — BH INPATIENT PSYCHIATRY PROGRESS NOTE - NSBHCHARTREVIEWVS_PSY_A_CORE FT
Vital Signs Last 24 Hrs  T(C): 36.9 (04-19-22 @ 06:39), Max: 36.9 (04-19-22 @ 06:39)  T(F): 98.5 (04-19-22 @ 06:39), Max: 98.5 (04-19-22 @ 06:39)  HR: 76 (04-19-22 @ 06:39) (76 - 76)  BP: 118/69 (04-19-22 @ 06:39) (118/69 - 118/69)  BP(mean): --  RR: --  SpO2: --

## 2022-04-19 NOTE — BH PSYCHOLOGY - GROUP THERAPY NOTE - NSPSYCHOLGRPDBTEMOT_PSY_A_CORE FT
Accumulating Positive Emotions in the Long-Term
na
taught Emotion Regulation Skill of Opposite Action
build mastery and cope ahead
na

## 2022-04-19 NOTE — BH PSYCHOLOGY - GROUP THERAPY NOTE - NSBHPSYCHOLASSESSPROV_PSY_A_CORE
Licensed Psychologist

## 2022-04-19 NOTE — BH INPATIENT PSYCHIATRY DISCHARGE NOTE - DESCRIPTION
single and currently living with her friend, Mary (in [facility]).. been living with Mary since 2/2022. goes to trade school ([employer]). has 2 siblings (a half brother whom she is not close with and a 17 yr old sister, Holly. she likes playing videogames - [employer] as well as viewing Lifestyle Vlogging on [employer] ([employer]  Pt is Jewish. no reported pets. no reported access to guns. employed part time as a HHA

## 2022-04-19 NOTE — BH PSYCHOLOGY - GROUP THERAPY NOTE - TOKEN PULL-DIAGNOSIS
Primary Diagnosis:  Major depressive disorder [F32.9]      Depressive disorder [F32.A]        Problem Dx:   Anxiety disorder, unspecified type [F41.9]      Depressive disorder [F32.A]      
Primary Diagnosis:  Major depressive disorder [F32.9]      Depressive disorder [F32.A]        Problem Dx:   Anxiety disorder, unspecified type [F41.9]      Depressive disorder [F32.A]      
Primary Diagnosis:  Depressive disorder [F32.A]        Problem Dx:   Anxiety disorder, unspecified type [F41.9]      Depressive disorder [F32.A]      
Primary Diagnosis:  Major depressive disorder [F32.9]      Depressive disorder [F32.A]        Problem Dx:   Anxiety disorder, unspecified type [F41.9]      Depressive disorder [F32.A]      

## 2022-04-19 NOTE — BH PSYCHOLOGY - CLINICIAN PSYCHOTHERAPY NOTE - NSTXCOPEGOAL_PSY_ALL_CORE
Identify and utilize 2 coping skills that meet their needs
Identify and utilize 2 coping skills that meet their needs
ambulatory
Identify and utilize 2 coping skills that meet their needs

## 2022-04-19 NOTE — BH PSYCHOLOGY - GROUP THERAPY NOTE - NSPSYCHOLGRPGENINT_PSY_A_CORE
cognitive/behavioral therapy
cognitive/behavioral therapy/problem solving techniques discussed/stress management/supported coping skills/supportive therapy/treatment compliance encouraged/social skills training
DISCHARGE

## 2022-04-19 NOTE — BH INPATIENT PSYCHIATRY DISCHARGE NOTE - NSDCCPCAREPLAN_GEN_ALL_CORE_FT
3/7/17 1100  to discuss medication    PRINCIPAL DISCHARGE DIAGNOSIS  Diagnosis: MDD (major depressive disorder)  Assessment and Plan of Treatment:       SECONDARY DISCHARGE DIAGNOSES  Diagnosis: Borderline personality disorder  Assessment and Plan of Treatment:

## 2022-04-19 NOTE — BH INPATIENT PSYCHIATRY PROGRESS NOTE - NSBHFUPINTERVALHXFT_PSY_A_CORE
Chart reviewed. Case d/w interdisciplinary team. Patient seen and examined. No acute overnight events, no psychiatric PRNs required/requested. Compliant with standing medications. No physical complaints. Slept at 3am per log. Attending groups, created strong safety plan with individual therapist.     Today patient reports good mood overall. Enjoyed staying up late with peers last night and socializing. Slept well after this and reports good energy level today. Continues with 50% improvement in motivation. Is able to enjoy things and engage more than prior, able to focus. With less anticipatory anxiety re: discharge, feeling more confident as she will have outpatient providers and related support. Attending groups, liking DBT skills-looking forward to continuing groups outpatient. Tolerating increase in lexapro.    Good appetite and PO intake.

## 2022-04-19 NOTE — BH DISCHARGE NOTE NURSING/SOCIAL WORK/PSYCH REHAB - PATIENT PORTAL LINK FT
You can access the FollowMyHealth Patient Portal offered by [facility] by registering at the following website: [email or web address]. By joining [facility]’s FollowMyHealth portal, you will also be able to view your health information using other applications (apps) compatible with our system. 0 = independent

## 2022-04-19 NOTE — BH PSYCHOLOGY - GROUP THERAPY NOTE - NSPSYCHOLGRPDBTINT_PSY_A_CORE FT
taught emotion regulation skill 
taught Emotion Regulation Skill of Opposite Action
taught mindfulness skill 
taught distress tolerance skill

## 2022-04-19 NOTE — BH INPATIENT PSYCHIATRY DISCHARGE NOTE - NSACTIVEVENT_PSY_ALL_CORE
Triggering events leading to humiliation, shame, and/or despair (e.g., Loss of relationship, financial or health status) (real or anticipated)/Legal problems/Perceived burden on family or others

## 2022-04-19 NOTE — BH PSYCHOLOGY - CLINICIAN PSYCHOTHERAPY NOTE - NSTXSUICIDGOAL_PSY_ALL_CORE
Will verbalize a decrease in preoccupation with suicidal thoughts and / or intent to commit suicide to 2 on a 10-point scale
Will identify and utilize 2 coping skills
Will verbalize a decrease in preoccupation with suicidal thoughts and / or intent to commit suicide to 2 on a 10-point scale

## 2022-04-19 NOTE — BH PSYCHOLOGY - GROUP THERAPY NOTE - NSBHPSYCHOLPARTICIP_PSY_A_CORE
Fully engaged
Partially engaged
Fully engaged
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
## 2022-04-19 NOTE — BH PSYCHOLOGY - CLINICIAN PSYCHOTHERAPY NOTE - TOKEN PULL-DIAGNOSIS
Primary Diagnosis:  Major depressive disorder [F32.9]      Depressive disorder [F32.A]        Problem Dx:   Anxiety disorder, unspecified type [F41.9]      Depressive disorder [F32.A]      

## 2022-04-19 NOTE — BH PSYCHOLOGY - GROUP THERAPY NOTE - NSBHPSYCHOLGRPTYPE_PSY_A_CORE
General Group Therapy
General Group Therapy
DBT Life Skills

## 2022-04-19 NOTE — BH PSYCHOLOGY - GROUP THERAPY NOTE - NSPSYCHOLGRPBILLING_PSY_A_CORE
[id number] - Group Psychotherapy
00210 - Group Psychotherapy
84333 - Group Psychotherapy
05566 - Group Psychotherapy
00016 - Group Psychotherapy
54614 - Group Psychotherapy
63373 - Group Psychotherapy

## 2022-04-20 VITALS — TEMPERATURE: 98 F | HEART RATE: 95 BPM | SYSTOLIC BLOOD PRESSURE: 129 MMHG | DIASTOLIC BLOOD PRESSURE: 84 MMHG

## 2022-04-20 PROCEDURE — 99238 HOSP IP/OBS DSCHRG MGMT 30/<: CPT

## 2022-04-20 RX ADMIN — ESCITALOPRAM OXALATE 15 MILLIGRAM(S): 10 TABLET, FILM COATED ORAL at 08:31

## 2022-04-20 NOTE — BH INPATIENT PSYCHIATRY PROGRESS NOTE - NSBHCHARTREVIEWVS_PSY_A_CORE FT
Vital Signs Last 24 Hrs  T(C): 36.7 (04-20-22 @ 08:27), Max: 36.7 (04-20-22 @ 08:27)  T(F): 98.1 (04-20-22 @ 08:27), Max: 98.1 (04-20-22 @ 08:27)  HR: 95 (04-20-22 @ 08:27) (95 - 95)  BP: 129/84 (04-20-22 @ 08:27) (129/84 - 129/84)  BP(mean): --  RR: --  SpO2: --

## 2022-04-20 NOTE — BH INPATIENT PSYCHIATRY PROGRESS NOTE - NSBHINPTBILLING_PSY_ALL_CORE
45630 - Inpatient Moderate Complexity
97298 - Hospital Discharge Day Management; 30 min or less
95095 - Inpatient Moderate Complexity
93220 - Inpatient Moderate Complexity
79422 - Inpatient Moderate Complexity
05132 - Inpatient Moderate Complexity
51868 - Inpatient Moderate Complexity
27709 - Inpatient Moderate Complexity

## 2022-04-20 NOTE — BH INPATIENT PSYCHIATRY PROGRESS NOTE - NSBHFUPINTERVALCCFT_PSY_A_CORE
"I'm doing okay, just trying to prepare for discharge."
"I'm feeling good."
"I'm feeling good, more cheerful."
"I'm feeling good."
"I'm doing okay, a little anxious."
"I'm doing okay, I'm trying to avoid my father."
"I'm doing okay, a little anxious."
"I'm feeling fine today."

## 2022-04-20 NOTE — BH INPATIENT PSYCHIATRY PROGRESS NOTE - NSBHMETABOLIC_PSY_ALL_CORE_FT
Patient is amenable to YAG capsulotomy so plan YAG capsulotomy OS. BMI:   HbA1c:   Glucose:   BP: 129/84 (04-20-22 @ 08:27) (118/69 - 129/84)  Lipid Panel: Date/Time: 04-12-22 @ 09:42  Cholesterol, Serum: 155  Direct LDL: --  HDL Cholesterol, Serum: 66  Total Cholesterol/HDL Ration Measurement: --  Triglycerides, Serum: 43

## 2022-04-20 NOTE — BH INPATIENT PSYCHIATRY PROGRESS NOTE - NSBHFUPINTERVALHXFT_PSY_A_CORE
Chart reviewed. Case d/w interdisciplinary team. Patient seen and examined. No acute overnight events, no psychiatric PRNs required/requested. Compliant with standing medications. No physical complaints. Slept at  1:15am per log. Attending groups, participating well.    Today patient reports good mood overall, is both excited and nervous for discharge. Has anxiety related to home life and returning to work and school. Able to review safety plan and cope ahead. Slept well last night although with some lower energy and fatigue today. Continues with stable improvement in motivation, enjoyment, engagement. Denies feeling frankly hopeless or worthless. Looking forward to outpatient treatment and continued use of DBT skills. Tolerating medications w/o issue or concern for side effect.    Good appetite and PO intake.

## 2022-04-20 NOTE — BH INPATIENT PSYCHIATRY PROGRESS NOTE - NSICDXBHSECONDARYDX_PSY_ALL_CORE
Anxiety disorder, unspecified type   F41.9  

## 2022-04-20 NOTE — BH INPATIENT PSYCHIATRY PROGRESS NOTE - MSE UNSTRUCTURED FT
The patient appears stated age, fair hygiene and dressed appropriately.  The patient was calm, cooperative with the interview and maintained appropriate eye contact with distant to appropriate relatedness.  No psychomotor agitation or retardation noted, no abnormal movements.  Steady gait observed.  The patient's speech was fluent, normal in tone, rate, and volume.  The patient's mood is "good, 4/5"  Affect is euthymic, stable and mood congruent, decreased range.  Thought process is goal directed. Thought content notable for improvement in depressive symptoms, anxiety re: stressors. No delusional content.  Denies any suicidal or homicidal ideation, intent, or plan. Denies hallucinations.  Cognition AAOx3. Insight is  fair.  Judgment is fair.  Impulse control has been fair on the unit.
The patient appears stated age, fair hygiene and dressed appropriately.  The patient was calm, cooperative with the interview and maintained appropriate eye contact with distant to appropriate relatedness.  No psychomotor agitation or retardation noted, no abnormal movements.  Steady gait observed.  The patient's speech was fluent, normal in tone, rate, and volume.  The patient's mood is "good, 4/5"  Affect is euthymic, stable and mood congruent, decreased range.  Thought process is goal directed. Thought content notable for improvement in depressive symptoms, anxiety re: stressors. No delusional content.  Denies any suicidal or homicidal ideation, intent, or plan. Denies hallucinations.  Cognition AAOx3. Insight is  fair.  Judgment is fair.  Impulse control has been fair on the unit.
The patient appears stated age, fair hygiene and dressed appropriately.  The patient was calm, cooperative with the interview and maintained appropriate eye contact with distant to appropriate relatedness.  No psychomotor agitation or retardation noted, no abnormal movements.  Steady gait observed.  The patient's speech was fluent, normal in tone, rate, and volume.  The patient's mood is "good, happier"  Affect is euthymic, stable and mood congruent, decreased range.  Thought process is goal directed. Thought content notable for improvement in depressive symptoms. No delusional content.  Denies any suicidal or homicidal ideation, intent, or plan. Denies hallucinations.  Cognition AAOx3. Insight is  fair.  Judgment is fair.  Impulse control has been fair on the unit.
The patient appears stated age, fair hygiene and dressed appropriately.  The patient was calm, cooperative with the interview and maintained appropriate eye contact with distant to appropriate relatedness.  No psychomotor agitation or retardation noted, no abnormal movements.  Steady gait observed.  The patient's speech was fluent, normal in tone, rate, and volume.  The patient's mood is "good, 4/5"  Affect is euthymic, stable and mood congruent, decreased range.  Thought process is goal directed. Thought content notable for improvement in depressive symptoms, anxiety re: stressors. No delusional content.  Denies any suicidal or homicidal ideation, intent, or plan. Denies hallucinations.  Cognition AAOx3. Insight is  fair.  Judgment is fair.  Impulse control has been fair on the unit.
The patient appears stated age, fair hygiene and dressed appropriately.  The patient was calm, cooperative with the interview and maintained appropriate eye contact with distant to appropriate relatedness.  No psychomotor agitation or retardation noted, no abnormal movements.  Steady gait observed.  The patient's speech was fluent, normal in tone, rate, and volume.  The patient's mood is "good."  Affect is euthymic, stable and mood congruent, full range.  Thought process is goal directed. Thought content notable for improvement in depressive symptoms, planning for discharge with related anticipatory anxiety. No delusional content.  Denies any suicidal or homicidal ideation, intent, or plan. Denies hallucinations.  Cognition AAOx3. Insight is  fair.  Judgment is fair.  Impulse control has been fair on the unit.
The patient appears stated age, fair hygiene and dressed appropriately.  The patient was calm, cooperative with the interview and maintained appropriate eye contact with distant to appropriate relatedness.  No psychomotor agitation or retardation noted, no abnormal movements.  Steady gait observed.  The patient's speech was fluent, normal in tone, rate, and volume.  The patient's mood is "okay"  Affect is euthymic, stable and mood congruent, decreased range.  Thought process is goal directed. Thought content notable for improvement in depressive symptoms, anxiety re: stressors, planning for discharge. No delusional content.  Denies any suicidal or homicidal ideation, intent, or plan. Denies hallucinations.  Cognition AAOx3. Insight is  fair.  Judgment is fair.  Impulse control has been fair on the unit.
The patient appears stated age, fair hygiene and dressed appropriately.  The patient was calm, cooperative with the interview and maintained appropriate eye contact with distant to appropriate relatedness.  No psychomotor agitation or retardation noted, no abnormal movements.  Steady gait observed.  The patient's speech was fluent, normal in tone, rate, and volume.  The patient's mood is "okay"  Affect is euthymic, stable and mood congruent, full range.  Thought process is goal directed. Thought content notable for improvement in depressive symptoms, planning for discharge. No delusional content.  Denies any suicidal or homicidal ideation, intent, or plan. Denies hallucinations.  Cognition AAOx3. Insight is  fair.  Judgment is fair.  Impulse control has been fair on the unit.
The patient appears stated age, fair hygiene and dressed appropriately.  The patient was calm, cooperative with the interview and maintained appropriate eye contact with distant to appropriate relatedness.  No psychomotor agitation or retardation noted, no abnormal movements.  Steady gait observed.  The patient's speech was fluent, normal in tone, rate, and volume.  The patient's mood is "fine."  Affect is euthymic, stable and mood congruent although incongruent to thought content.  Thought process is goal directed. Thought content notable for depressive symptoms and worsening S/I prior to admission. No delusional content.  Denies any suicidal or homicidal ideation, intent, or plan. Denies hallucinations.  Cognition AAOx3. Insight is limited to fair.  Judgment is limited fair.  Impulse control has been fair on the unit.

## 2022-04-20 NOTE — BH INPATIENT PSYCHIATRY PROGRESS NOTE - CURRENT MEDICATION
MEDICATIONS  (STANDING):  escitalopram 10 milliGRAM(s) Oral daily    MEDICATIONS  (PRN):  hydrOXYzine hydrochloride 25 milliGRAM(s) Oral every 6 hours PRN Anxiety  ibuprofen  Tablet. 600 milliGRAM(s) Oral every 8 hours PRN Mild Pain (1 - 3), Moderate Pain (4 - 6)  LORazepam     Tablet 0.5 milliGRAM(s) Oral every 6 hours PRN agitation/ Anxiety  LORazepam   Injectable 1 milliGRAM(s) IntraMuscular Once PRN severe agitation  
MEDICATIONS  (STANDING):  escitalopram 15 milliGRAM(s) Oral daily    MEDICATIONS  (PRN):  hydrOXYzine hydrochloride 25 milliGRAM(s) Oral every 6 hours PRN Anxiety  ibuprofen  Tablet. 600 milliGRAM(s) Oral every 8 hours PRN Mild Pain (1 - 3), Moderate Pain (4 - 6)  LORazepam     Tablet 0.5 milliGRAM(s) Oral every 6 hours PRN agitation/ Anxiety  LORazepam   Injectable 1 milliGRAM(s) IntraMuscular Once PRN severe agitation  
MEDICATIONS  (STANDING):  escitalopram 15 milliGRAM(s) Oral daily    MEDICATIONS  (PRN):  hydrOXYzine hydrochloride 25 milliGRAM(s) Oral every 6 hours PRN Anxiety  ibuprofen  Tablet. 600 milliGRAM(s) Oral every 8 hours PRN Mild Pain (1 - 3), Moderate Pain (4 - 6)  LORazepam     Tablet 0.5 milliGRAM(s) Oral every 6 hours PRN agitation/ Anxiety  LORazepam   Injectable 1 milliGRAM(s) IntraMuscular Once PRN severe agitation  
MEDICATIONS  (STANDING):    MEDICATIONS  (PRN):  hydrOXYzine hydrochloride 25 milliGRAM(s) Oral every 6 hours PRN Anxiety  LORazepam     Tablet 0.5 milliGRAM(s) Oral every 6 hours PRN agitation/ Anxiety  LORazepam   Injectable 1 milliGRAM(s) IntraMuscular Once PRN severe agitation  
MEDICATIONS  (STANDING):    MEDICATIONS  (PRN):  hydrOXYzine hydrochloride 25 milliGRAM(s) Oral every 6 hours PRN Anxiety  ibuprofen  Tablet. 600 milliGRAM(s) Oral every 8 hours PRN Mild Pain (1 - 3), Moderate Pain (4 - 6)  LORazepam     Tablet 0.5 milliGRAM(s) Oral every 6 hours PRN agitation/ Anxiety  LORazepam   Injectable 1 milliGRAM(s) IntraMuscular Once PRN severe agitation  
MEDICATIONS  (STANDING):  escitalopram 10 milliGRAM(s) Oral daily    MEDICATIONS  (PRN):  hydrOXYzine hydrochloride 25 milliGRAM(s) Oral every 6 hours PRN Anxiety  ibuprofen  Tablet. 600 milliGRAM(s) Oral every 8 hours PRN Mild Pain (1 - 3), Moderate Pain (4 - 6)  LORazepam     Tablet 0.5 milliGRAM(s) Oral every 6 hours PRN agitation/ Anxiety  LORazepam   Injectable 1 milliGRAM(s) IntraMuscular Once PRN severe agitation  
MEDICATIONS  (STANDING):    MEDICATIONS  (PRN):  hydrOXYzine hydrochloride 25 milliGRAM(s) Oral every 6 hours PRN Anxiety  ibuprofen  Tablet. 600 milliGRAM(s) Oral every 8 hours PRN Mild Pain (1 - 3), Moderate Pain (4 - 6)  LORazepam     Tablet 0.5 milliGRAM(s) Oral every 6 hours PRN agitation/ Anxiety  LORazepam   Injectable 1 milliGRAM(s) IntraMuscular Once PRN severe agitation  
MEDICATIONS  (STANDING):  escitalopram 10 milliGRAM(s) Oral daily    MEDICATIONS  (PRN):  hydrOXYzine hydrochloride 25 milliGRAM(s) Oral every 6 hours PRN Anxiety  ibuprofen  Tablet. 600 milliGRAM(s) Oral every 8 hours PRN Mild Pain (1 - 3), Moderate Pain (4 - 6)  LORazepam     Tablet 0.5 milliGRAM(s) Oral every 6 hours PRN agitation/ Anxiety  LORazepam   Injectable 1 milliGRAM(s) IntraMuscular Once PRN severe agitation  
MEDICATIONS  (STANDING):  escitalopram 5 milliGRAM(s) Oral daily    MEDICATIONS  (PRN):  hydrOXYzine hydrochloride 25 milliGRAM(s) Oral every 6 hours PRN Anxiety  ibuprofen  Tablet. 600 milliGRAM(s) Oral every 8 hours PRN Mild Pain (1 - 3), Moderate Pain (4 - 6)  LORazepam     Tablet 0.5 milliGRAM(s) Oral every 6 hours PRN agitation/ Anxiety  LORazepam   Injectable 1 milliGRAM(s) IntraMuscular Once PRN severe agitation

## 2022-04-20 NOTE — BH INPATIENT PSYCHIATRY PROGRESS NOTE - NSICDXBHPRIMARYDX_PSY_ALL_CORE
Major depressive disorder   F32.9  

## 2022-04-20 NOTE — BH INPATIENT PSYCHIATRY PROGRESS NOTE - NSTXSUICIDGOAL_PSY_ALL_CORE
Will verbalize a decrease in preoccupation with suicidal thoughts and / or intent to commit suicide to 2 on a 10-point scale
Will identify and utilize 2 coping skills
Will verbalize a decrease in preoccupation with suicidal thoughts and / or intent to commit suicide to 2 on a 10-point scale
Will verbalize a decrease in preoccupation with suicidal thoughts and / or intent to commit suicide to 2 on a 10-point scale
Will identify and utilize 2 coping skills

## 2022-04-20 NOTE — BH INPATIENT PSYCHIATRY PROGRESS NOTE - NSBHASSESSSUMMFT_PSY_ALL_CORE
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     Dx c/w MDD and BPD.    Today continues to report symptom improvement with good mood, improved motivation and engagement, stable sleep and appetite, denying hopelessness or worthlessness. She denies passive and active S/I, urges for NSSIB, and is future oriented towards school, work, treatment and social relationships.      Risk Assessment:  The patient is at chronic risk of suicide given history of depression, anxiety, BPD, S/I, trauma, conflict with parental support system, and now inpatient psychiatric hospitalization. Chronic protective factors include lack of suicide attempts, non-suicidal self-injury, substance abuse, violence, peer supports and ability to form therapeutic relationships.     On admission patient was felt to be at an acutely elevated risk of  suicide given worsening depressive symptoms, active suicidal ideation with plan, intent and preparation. Over the course of treatment patient reported improvement in mood, motivation, hopelessness, anhedonia, and resolution of passive and active S/I. She was engaged in treatment and attended to ADLs. At discharge she is future oriented in multiple spheres including treatment, work, school and relationships; and she created a strong safety plan with her therapist.     Given the above the patient is no longer felt to be at an acutely elevated risk of suicide and is stable for discharge and transition to outpatient level of care.     Plan:  -discharge home today with sister and best friend; pt in agreement with treatment plan  -continue lexapro 15 mg daily for depression and anxiety; 30 day supply of medications provided  -f/u in [facility]  -emergency procedures discussed and safety plan reviewed including use of 911, ED and crisis clinic  -aware of how to contact [facility] if concerns or questions emerge

## 2022-04-20 NOTE — BH INPATIENT PSYCHIATRY PROGRESS NOTE - PRN MEDS
MEDICATIONS  (PRN):  hydrOXYzine hydrochloride 25 milliGRAM(s) Oral every 6 hours PRN Anxiety  ibuprofen  Tablet. 600 milliGRAM(s) Oral every 8 hours PRN Mild Pain (1 - 3), Moderate Pain (4 - 6)  LORazepam     Tablet 0.5 milliGRAM(s) Oral every 6 hours PRN agitation/ Anxiety  LORazepam   Injectable 1 milliGRAM(s) IntraMuscular Once PRN severe agitation  
MEDICATIONS  (PRN):  hydrOXYzine hydrochloride 25 milliGRAM(s) Oral every 6 hours PRN Anxiety  ibuprofen  Tablet. 600 milliGRAM(s) Oral every 8 hours PRN Mild Pain (1 - 3), Moderate Pain (4 - 6)  LORazepam     Tablet 0.5 milliGRAM(s) Oral every 6 hours PRN agitation/ Anxiety  LORazepam   Injectable 1 milliGRAM(s) IntraMuscular Once PRN severe agitation  
MEDICATIONS  (PRN):  hydrOXYzine hydrochloride 25 milliGRAM(s) Oral every 6 hours PRN Anxiety  LORazepam     Tablet 0.5 milliGRAM(s) Oral every 6 hours PRN agitation/ Anxiety  LORazepam   Injectable 1 milliGRAM(s) IntraMuscular Once PRN severe agitation  
MEDICATIONS  (PRN):  hydrOXYzine hydrochloride 25 milliGRAM(s) Oral every 6 hours PRN Anxiety  ibuprofen  Tablet. 600 milliGRAM(s) Oral every 8 hours PRN Mild Pain (1 - 3), Moderate Pain (4 - 6)  LORazepam     Tablet 0.5 milliGRAM(s) Oral every 6 hours PRN agitation/ Anxiety  LORazepam   Injectable 1 milliGRAM(s) IntraMuscular Once PRN severe agitation  

## 2022-04-20 NOTE — BH INPATIENT PSYCHIATRY PROGRESS NOTE - NSTXDEPRESINTERMD_PSY_ALL_CORE
lexapro trial, psychoeducation, support therapeutic interventions

## 2022-04-20 NOTE — BH INPATIENT PSYCHIATRY PROGRESS NOTE - NSBHCONSBHPROVCNTCTNOFT_PSY_A_CORE
Outreach to therapist will be made.

## 2022-04-22 ENCOUNTER — OUTPATIENT (OUTPATIENT)
Dept: OUTPATIENT SERVICES | Facility: HOSPITAL | Age: 22
LOS: 1 days | Discharge: HOME | End: 2022-04-22
Payer: COMMERCIAL

## 2022-05-03 PROCEDURE — 90834 PSYTX W PT 45 MINUTES: CPT | Mod: 1L,95

## 2022-05-03 PROCEDURE — 90792 PSYCH DIAG EVAL W/MED SRVCS: CPT | Mod: 1L,95

## 2022-05-04 PROCEDURE — 90832 PSYTX W PT 30 MINUTES: CPT | Mod: 1L,95

## 2022-05-04 PROCEDURE — 90853 GROUP PSYCHOTHERAPY: CPT | Mod: 1L

## 2022-05-04 NOTE — BH PSYCHOLOGY - CLINICIAN PSYCHOTHERAPY NOTE - NSBHPSYCHOLBILLFAM_PSY_A_CORE
[id number] - 16 to 37 minutes
Bed: 16  Expected date:   Expected time:   Means of arrival:   Comments:  EMS
80186 - 16 to 37 minutes
83330 - 16 to 37 minutes
01150 - 16 to 37 minutes
60924 - 16 to 37 minutes

## 2022-05-11 PROCEDURE — 98967 PH1 ASSMT&MGMT NQHP 11-20: CPT | Mod: 1L

## 2022-05-11 PROCEDURE — 90853 GROUP PSYCHOTHERAPY: CPT | Mod: 1L

## 2022-05-11 PROCEDURE — 98968 PH1 ASSMT&MGMT NQHP 21-30: CPT | Mod: 1L

## 2022-05-19 PROCEDURE — 90834 PSYTX W PT 45 MINUTES: CPT | Mod: 95

## 2022-05-25 PROCEDURE — 90834 PSYTX W PT 45 MINUTES: CPT | Mod: 95

## 2022-05-27 DIAGNOSIS — F32.9 MAJOR DEPRESSIVE DISORDER, SINGLE EPISODE, UNSPECIFIED: ICD-10-CM

## 2022-05-27 DIAGNOSIS — F60.3 BORDERLINE PERSONALITY DISORDER: ICD-10-CM

## 2022-06-01 PROCEDURE — 90832 PSYTX W PT 30 MINUTES: CPT | Mod: 95

## 2022-06-08 PROCEDURE — 90834 PSYTX W PT 45 MINUTES: CPT | Mod: 95

## 2022-07-08 PROCEDURE — 90834 PSYTX W PT 45 MINUTES: CPT | Mod: 95

## 2022-07-21 PROCEDURE — 99214 OFFICE O/P EST MOD 30 MIN: CPT | Mod: 95,25

## 2022-07-21 PROCEDURE — 90853 GROUP PSYCHOTHERAPY: CPT | Mod: 95

## 2022-08-03 NOTE — PSYCHIATRIC REHAB INITIAL EVALUATION - NSBHPRTOOLBOX_PSY_ALL_CORE
Hx of rash in inguinal folds. Unable to recall which ointments were being used.  - Continue nystatin powder BID  - Appreciate derm recs: mupirocin ointment TID to perianal erosions  - CTM for worsening of rash Family support/Friend support/Spirituality/Zoroastrian

## 2022-08-25 PROCEDURE — 90834 PSYTX W PT 45 MINUTES: CPT | Mod: 95

## 2022-08-31 NOTE — ED BEHAVIORAL HEALTH ASSESSMENT NOTE - PERSONAL COLLATERAL NAME
[FreeTextEntry1] : next colonoscopy due 2026\par metastatic breast cancer [facility] responding well\par elevated BP at physical ok today\par machine verified and accuraqte\par uses properly\par 
see [facility] notes for details

## 2022-09-01 PROCEDURE — 99214 OFFICE O/P EST MOD 30 MIN: CPT | Mod: 95

## 2022-09-21 PROCEDURE — 90832 PSYTX W PT 30 MINUTES: CPT | Mod: 95

## 2022-10-11 PROCEDURE — 90834 PSYTX W PT 45 MINUTES: CPT | Mod: 95

## 2022-10-25 PROCEDURE — 90834 PSYTX W PT 45 MINUTES: CPT | Mod: 95

## 2022-11-04 PROCEDURE — 90834 PSYTX W PT 45 MINUTES: CPT | Mod: 95

## 2022-11-17 PROCEDURE — 99214 OFFICE O/P EST MOD 30 MIN: CPT | Mod: 95

## 2022-11-22 PROCEDURE — 90834 PSYTX W PT 45 MINUTES: CPT | Mod: 95

## 2022-12-07 PROCEDURE — 90853 GROUP PSYCHOTHERAPY: CPT | Mod: 95

## 2022-12-12 PROCEDURE — 90834 PSYTX W PT 45 MINUTES: CPT | Mod: 95

## 2023-02-06 PROCEDURE — 90834 PSYTX W PT 45 MINUTES: CPT | Mod: 95

## 2023-02-13 PROCEDURE — 90834 PSYTX W PT 45 MINUTES: CPT | Mod: 95

## 2023-03-16 PROCEDURE — 99214 OFFICE O/P EST MOD 30 MIN: CPT | Mod: 95

## 2023-03-24 PROCEDURE — 90834 PSYTX W PT 45 MINUTES: CPT | Mod: 95

## 2023-04-10 PROCEDURE — 90834 PSYTX W PT 45 MINUTES: CPT | Mod: 95

## 2023-04-13 PROCEDURE — 99214 OFFICE O/P EST MOD 30 MIN: CPT

## 2023-04-22 PROCEDURE — 90833 PSYTX W PT W E/M 30 MIN: CPT | Mod: 95

## 2023-04-22 PROCEDURE — 99213 OFFICE O/P EST LOW 20 MIN: CPT | Mod: 95

## 2023-04-27 PROCEDURE — 90834 PSYTX W PT 45 MINUTES: CPT | Mod: 59,95

## 2023-04-27 PROCEDURE — 90853 GROUP PSYCHOTHERAPY: CPT | Mod: 95

## 2023-05-04 PROCEDURE — 90834 PSYTX W PT 45 MINUTES: CPT | Mod: 95

## 2023-05-18 PROCEDURE — 90834 PSYTX W PT 45 MINUTES: CPT | Mod: 95

## 2023-05-25 PROCEDURE — 90834 PSYTX W PT 45 MINUTES: CPT | Mod: 95

## 2023-05-25 PROCEDURE — 99214 OFFICE O/P EST MOD 30 MIN: CPT | Mod: 95

## 2023-06-08 PROCEDURE — 90834 PSYTX W PT 45 MINUTES: CPT | Mod: 95

## 2023-06-15 PROCEDURE — 99214 OFFICE O/P EST MOD 30 MIN: CPT

## 2023-07-13 PROCEDURE — 99214 OFFICE O/P EST MOD 30 MIN: CPT | Mod: 95

## 2023-07-14 PROCEDURE — 90834 PSYTX W PT 45 MINUTES: CPT | Mod: 95

## 2023-07-21 PROCEDURE — 90834 PSYTX W PT 45 MINUTES: CPT | Mod: 95

## 2023-08-23 PROCEDURE — 90834 PSYTX W PT 45 MINUTES: CPT

## 2023-09-01 PROCEDURE — 90834 PSYTX W PT 45 MINUTES: CPT | Mod: 95

## 2023-09-28 PROCEDURE — 90834 PSYTX W PT 45 MINUTES: CPT

## 2023-10-30 PROCEDURE — 90834 PSYTX W PT 45 MINUTES: CPT

## 2023-11-02 NOTE — ED BEHAVIORAL HEALTH ASSESSMENT NOTE - ESTIMATED INTELLIGENCE
Patient discharged home with family. Discharge AVS reviewed with patient and daughter. All questions answered. All belongings sent with patient. Patient transported to front lobby via wheelchair accompanied by staff.   Average

## 2023-11-06 PROCEDURE — 90834 PSYTX W PT 45 MINUTES: CPT

## 2023-11-06 PROCEDURE — 99214 OFFICE O/P EST MOD 30 MIN: CPT | Mod: 95

## 2024-01-18 PROCEDURE — 90832 PSYTX W PT 30 MINUTES: CPT | Mod: 95

## 2024-03-29 PROCEDURE — 90834 PSYTX W PT 45 MINUTES: CPT | Mod: 95

## 2024-04-11 PROCEDURE — 90834 PSYTX W PT 45 MINUTES: CPT | Mod: 93

## 2024-04-15 PROCEDURE — 90834 PSYTX W PT 45 MINUTES: CPT | Mod: 93

## 2024-05-03 PROCEDURE — 90834 PSYTX W PT 45 MINUTES: CPT | Mod: 95

## 2024-06-05 PROCEDURE — 90853 GROUP PSYCHOTHERAPY: CPT | Mod: 95

## 2024-06-12 PROCEDURE — 90834 PSYTX W PT 45 MINUTES: CPT | Mod: 93

## 2024-07-02 PROCEDURE — 99214 OFFICE O/P EST MOD 30 MIN: CPT | Mod: 95

## 2024-07-02 PROCEDURE — 90833 PSYTX W PT W E/M 30 MIN: CPT | Mod: 93

## 2024-08-12 PROCEDURE — 90833 PSYTX W PT W E/M 30 MIN: CPT | Mod: 93

## 2024-09-20 PROCEDURE — 90832 PSYTX W PT 30 MINUTES: CPT | Mod: 95

## 2024-09-27 NOTE — BH PSYCHOLOGY - CLINICIAN PSYCHOTHERAPY NOTE - NSBHPSYCHOLINT_PSY_A_CORE
Dialectical  Behavioral Therapy (DBT)
Dialectical  Behavioral Therapy (DBT)
No
Dialectical  Behavioral Therapy (DBT)
impaired
Dialectical  Behavioral Therapy (DBT)
Dialectical  Behavioral Therapy (DBT)

## 2024-11-22 PROCEDURE — 90834 PSYTX W PT 45 MINUTES: CPT | Mod: 95

## 2024-12-19 PROCEDURE — 99214 OFFICE O/P EST MOD 30 MIN: CPT | Mod: 95

## 2024-12-20 PROCEDURE — 90834 PSYTX W PT 45 MINUTES: CPT | Mod: 95

## 2025-01-17 PROCEDURE — 90834 PSYTX W PT 45 MINUTES: CPT | Mod: 95

## 2025-01-24 PROCEDURE — 90834 PSYTX W PT 45 MINUTES: CPT | Mod: 95

## 2025-01-27 PROCEDURE — 90834 PSYTX W PT 45 MINUTES: CPT | Mod: 95

## 2025-02-03 PROCEDURE — 90834 PSYTX W PT 45 MINUTES: CPT | Mod: 93

## 2025-07-07 PROCEDURE — 90834 PSYTX W PT 45 MINUTES: CPT | Mod: 95

## 2025-07-28 PROCEDURE — 90834 PSYTX W PT 45 MINUTES: CPT | Mod: 93
